# Patient Record
Sex: FEMALE | Race: WHITE | NOT HISPANIC OR LATINO | Employment: OTHER | URBAN - NONMETROPOLITAN AREA
[De-identification: names, ages, dates, MRNs, and addresses within clinical notes are randomized per-mention and may not be internally consistent; named-entity substitution may affect disease eponyms.]

---

## 2019-05-31 ENCOUNTER — HOSPITAL ENCOUNTER (OUTPATIENT)
Dept: MAMMOGRAPHY | Facility: HOSPITAL | Age: 74
Discharge: HOME | End: 2019-05-31
Admitting: PHYSICIAN ASSISTANT

## 2019-05-31 DIAGNOSIS — Z12.39 SCREENING BREAST EXAMINATION: ICD-10-CM

## 2019-05-31 PROCEDURE — 77063 BREAST TOMOSYNTHESIS BI: CPT

## 2019-05-31 PROCEDURE — 77067 SCR MAMMO BI INCL CAD: CPT | Performed by: RADIOLOGY

## 2019-05-31 PROCEDURE — 77063 BREAST TOMOSYNTHESIS BI: CPT | Performed by: RADIOLOGY

## 2019-05-31 PROCEDURE — 77067 SCR MAMMO BI INCL CAD: CPT

## 2019-06-26 ENCOUNTER — HOSPITAL ENCOUNTER (OUTPATIENT)
Dept: ULTRASOUND IMAGING | Facility: HOSPITAL | Age: 74
Discharge: HOME | End: 2019-06-26

## 2019-06-26 ENCOUNTER — HOSPITAL ENCOUNTER (OUTPATIENT)
Dept: MAMMOGRAPHY | Facility: HOSPITAL | Age: 74
Discharge: HOME | End: 2019-06-26
Admitting: RADIOLOGY

## 2019-06-26 DIAGNOSIS — R92.8 ABNORMAL MAMMOGRAM: ICD-10-CM

## 2019-06-26 PROCEDURE — 76642 ULTRASOUND BREAST LIMITED: CPT

## 2019-06-26 PROCEDURE — 77065 DX MAMMO INCL CAD UNI: CPT

## 2019-06-26 PROCEDURE — 76642 ULTRASOUND BREAST LIMITED: CPT | Performed by: RADIOLOGY

## 2019-06-26 PROCEDURE — G0279 TOMOSYNTHESIS, MAMMO: HCPCS

## 2019-06-26 PROCEDURE — 77065 DX MAMMO INCL CAD UNI: CPT | Performed by: RADIOLOGY

## 2019-06-26 PROCEDURE — G0279 TOMOSYNTHESIS, MAMMO: HCPCS | Performed by: RADIOLOGY

## 2019-07-17 ENCOUNTER — HOSPITAL ENCOUNTER (EMERGENCY)
Facility: HOSPITAL | Age: 74
Discharge: HOME | End: 2019-07-17
Admitting: EMERGENCY MEDICINE

## 2019-07-17 VITALS
TEMPERATURE: 98.2 F | HEART RATE: 84 BPM | SYSTOLIC BLOOD PRESSURE: 146 MMHG | BODY MASS INDEX: 28.47 KG/M2 | RESPIRATION RATE: 18 BRPM | OXYGEN SATURATION: 100 % | DIASTOLIC BLOOD PRESSURE: 76 MMHG | HEIGHT: 60 IN | WEIGHT: 145 LBS

## 2019-07-17 DIAGNOSIS — R11.10 VOMITING AND DIARRHEA: Primary | ICD-10-CM

## 2019-07-17 DIAGNOSIS — R19.7 VOMITING AND DIARRHEA: Primary | ICD-10-CM

## 2019-07-17 LAB
ALBUMIN SERPL-MCNC: 4.3 G/DL (ref 3.5–5.2)
ALBUMIN/GLOB SERPL: 1.5 G/DL
ALP SERPL-CCNC: 79 U/L (ref 39–117)
ALT SERPL W P-5'-P-CCNC: 27 U/L (ref 1–33)
AMYLASE SERPL-CCNC: 31 U/L (ref 28–100)
ANION GAP SERPL CALCULATED.3IONS-SCNC: 14 MMOL/L (ref 5–15)
AST SERPL-CCNC: 27 U/L (ref 1–32)
BASOPHILS # BLD AUTO: 0.01 10*3/MM3 (ref 0–0.2)
BASOPHILS NFR BLD AUTO: 0.1 % (ref 0–1.5)
BILIRUB SERPL-MCNC: 0.6 MG/DL (ref 0.2–1.2)
BILIRUB UR QL STRIP: NEGATIVE
BUN BLD-MCNC: 22 MG/DL (ref 8–23)
BUN/CREAT SERPL: 28.9 (ref 7–25)
CALCIUM SPEC-SCNC: 9.9 MG/DL (ref 8.6–10.5)
CHLORIDE SERPL-SCNC: 102 MMOL/L (ref 98–107)
CLARITY UR: CLEAR
CO2 SERPL-SCNC: 24 MMOL/L (ref 22–29)
COLOR UR: YELLOW
CREAT BLD-MCNC: 0.76 MG/DL (ref 0.57–1)
DEPRECATED RDW RBC AUTO: 42.8 FL (ref 37–54)
EOSINOPHIL # BLD AUTO: 0.04 10*3/MM3 (ref 0–0.4)
EOSINOPHIL NFR BLD AUTO: 0.5 % (ref 0.3–6.2)
ERYTHROCYTE [DISTWIDTH] IN BLOOD BY AUTOMATED COUNT: 12.7 % (ref 12.3–15.4)
GFR SERPL CREATININE-BSD FRML MDRD: 75 ML/MIN/1.73
GLOBULIN UR ELPH-MCNC: 2.9 GM/DL
GLUCOSE BLD-MCNC: 145 MG/DL (ref 65–99)
GLUCOSE UR STRIP-MCNC: NEGATIVE MG/DL
HCT VFR BLD AUTO: 40.6 % (ref 34–46.6)
HGB BLD-MCNC: 13.4 G/DL (ref 12–15.9)
HGB UR QL STRIP.AUTO: NEGATIVE
IMM GRANULOCYTES # BLD AUTO: 0.02 10*3/MM3 (ref 0–0.05)
IMM GRANULOCYTES NFR BLD AUTO: 0.2 % (ref 0–0.5)
KETONES UR QL STRIP: NEGATIVE
LEUKOCYTE ESTERASE UR QL STRIP.AUTO: NEGATIVE
LIPASE SERPL-CCNC: 43 U/L (ref 13–60)
LYMPHOCYTES # BLD AUTO: 1.62 10*3/MM3 (ref 0.7–3.1)
LYMPHOCYTES NFR BLD AUTO: 18.6 % (ref 19.6–45.3)
MCH RBC QN AUTO: 31.5 PG (ref 26.6–33)
MCHC RBC AUTO-ENTMCNC: 33 G/DL (ref 31.5–35.7)
MCV RBC AUTO: 95.3 FL (ref 79–97)
MONOCYTES # BLD AUTO: 0.31 10*3/MM3 (ref 0.1–0.9)
MONOCYTES NFR BLD AUTO: 3.6 % (ref 5–12)
NEUTROPHILS # BLD AUTO: 6.7 10*3/MM3 (ref 1.7–7)
NEUTROPHILS NFR BLD AUTO: 77 % (ref 42.7–76)
NITRITE UR QL STRIP: NEGATIVE
PH UR STRIP.AUTO: 7 [PH] (ref 5–8)
PLATELET # BLD AUTO: 316 10*3/MM3 (ref 140–450)
PMV BLD AUTO: 10 FL (ref 6–12)
POTASSIUM BLD-SCNC: 3.8 MMOL/L (ref 3.5–5.2)
PROT SERPL-MCNC: 7.2 G/DL (ref 6–8.5)
PROT UR QL STRIP: NEGATIVE
RBC # BLD AUTO: 4.26 10*6/MM3 (ref 3.77–5.28)
SODIUM BLD-SCNC: 140 MMOL/L (ref 136–145)
SP GR UR STRIP: 1.02 (ref 1–1.03)
TROPONIN T SERPL-MCNC: <0.01 NG/ML (ref 0–0.03)
UROBILINOGEN UR QL STRIP: NORMAL
WBC NRBC COR # BLD: 8.7 10*3/MM3 (ref 3.4–10.8)

## 2019-07-17 PROCEDURE — 82150 ASSAY OF AMYLASE: CPT | Performed by: NURSE PRACTITIONER

## 2019-07-17 PROCEDURE — 83690 ASSAY OF LIPASE: CPT | Performed by: NURSE PRACTITIONER

## 2019-07-17 PROCEDURE — 81003 URINALYSIS AUTO W/O SCOPE: CPT | Performed by: NURSE PRACTITIONER

## 2019-07-17 PROCEDURE — 85025 COMPLETE CBC W/AUTO DIFF WBC: CPT | Performed by: NURSE PRACTITIONER

## 2019-07-17 PROCEDURE — 93005 ELECTROCARDIOGRAM TRACING: CPT | Performed by: NURSE PRACTITIONER

## 2019-07-17 PROCEDURE — 93010 ELECTROCARDIOGRAM REPORT: CPT | Performed by: INTERNAL MEDICINE

## 2019-07-17 PROCEDURE — 99284 EMERGENCY DEPT VISIT MOD MDM: CPT

## 2019-07-17 PROCEDURE — 84484 ASSAY OF TROPONIN QUANT: CPT | Performed by: NURSE PRACTITIONER

## 2019-07-17 PROCEDURE — 80053 COMPREHEN METABOLIC PANEL: CPT | Performed by: NURSE PRACTITIONER

## 2019-07-17 RX ORDER — ONDANSETRON 4 MG/1
4 TABLET, ORALLY DISINTEGRATING ORAL EVERY 6 HOURS PRN
Qty: 12 TABLET | Refills: 0 | Status: SHIPPED | OUTPATIENT
Start: 2019-07-17 | End: 2021-08-23

## 2019-07-17 RX ORDER — SODIUM CHLORIDE 0.9 % (FLUSH) 0.9 %
10 SYRINGE (ML) INJECTION AS NEEDED
Status: DISCONTINUED | OUTPATIENT
Start: 2019-07-17 | End: 2019-07-17 | Stop reason: HOSPADM

## 2019-07-17 RX ORDER — LOSARTAN POTASSIUM AND HYDROCHLOROTHIAZIDE 25; 100 MG/1; MG/1
1 TABLET ORAL DAILY
COMMUNITY
End: 2021-06-17 | Stop reason: ALTCHOICE

## 2021-02-17 DIAGNOSIS — Z23 IMMUNIZATION DUE: ICD-10-CM

## 2021-06-15 ENCOUNTER — HOSPITAL ENCOUNTER (EMERGENCY)
Facility: HOSPITAL | Age: 76
Discharge: HOME | End: 2021-06-15
Attending: EMERGENCY MEDICINE | Admitting: EMERGENCY MEDICINE

## 2021-06-15 ENCOUNTER — APPOINTMENT (OUTPATIENT)
Dept: GENERAL RADIOLOGY | Facility: HOSPITAL | Age: 76
End: 2021-06-15

## 2021-06-15 VITALS
SYSTOLIC BLOOD PRESSURE: 148 MMHG | HEIGHT: 60 IN | OXYGEN SATURATION: 99 % | BODY MASS INDEX: 27.48 KG/M2 | WEIGHT: 140 LBS | TEMPERATURE: 98 F | DIASTOLIC BLOOD PRESSURE: 74 MMHG | RESPIRATION RATE: 18 BRPM | HEART RATE: 68 BPM

## 2021-06-15 DIAGNOSIS — R00.2 PALPITATIONS: Primary | ICD-10-CM

## 2021-06-15 DIAGNOSIS — J18.9 PNEUMONIA OF RIGHT UPPER LOBE DUE TO INFECTIOUS ORGANISM: ICD-10-CM

## 2021-06-15 LAB
ALBUMIN SERPL-MCNC: 4.03 G/DL (ref 3.5–5.2)
ALBUMIN/GLOB SERPL: 1.3 G/DL
ALP SERPL-CCNC: 78 U/L (ref 39–117)
ALT SERPL W P-5'-P-CCNC: 25 U/L (ref 1–33)
ANION GAP SERPL CALCULATED.3IONS-SCNC: 9.9 MMOL/L (ref 5–15)
AST SERPL-CCNC: 26 U/L (ref 1–32)
BASOPHILS # BLD AUTO: 0.05 10*3/MM3 (ref 0–0.2)
BASOPHILS NFR BLD AUTO: 0.5 % (ref 0–1.5)
BILIRUB SERPL-MCNC: 0.6 MG/DL (ref 0–1.2)
BUN SERPL-MCNC: 21 MG/DL (ref 8–23)
BUN/CREAT SERPL: 23.1 (ref 7–25)
CALCIUM SPEC-SCNC: 9.9 MG/DL (ref 8.6–10.5)
CHLORIDE SERPL-SCNC: 106 MMOL/L (ref 98–107)
CO2 SERPL-SCNC: 25.1 MMOL/L (ref 22–29)
CREAT SERPL-MCNC: 0.91 MG/DL (ref 0.57–1)
DEPRECATED RDW RBC AUTO: 43.5 FL (ref 37–54)
EOSINOPHIL # BLD AUTO: 0.2 10*3/MM3 (ref 0–0.4)
EOSINOPHIL NFR BLD AUTO: 2.1 % (ref 0.3–6.2)
ERYTHROCYTE [DISTWIDTH] IN BLOOD BY AUTOMATED COUNT: 12.4 % (ref 12.3–15.4)
GFR SERPL CREATININE-BSD FRML MDRD: 60 ML/MIN/1.73
GLOBULIN UR ELPH-MCNC: 3.2 GM/DL
GLUCOSE SERPL-MCNC: 112 MG/DL (ref 65–99)
HCT VFR BLD AUTO: 38.7 % (ref 34–46.6)
HGB BLD-MCNC: 13.2 G/DL (ref 12–15.9)
HOLD SPECIMEN: NORMAL
HOLD SPECIMEN: NORMAL
IMM GRANULOCYTES # BLD AUTO: 0.03 10*3/MM3 (ref 0–0.05)
IMM GRANULOCYTES NFR BLD AUTO: 0.3 % (ref 0–0.5)
LYMPHOCYTES # BLD AUTO: 2.94 10*3/MM3 (ref 0.7–3.1)
LYMPHOCYTES NFR BLD AUTO: 31.4 % (ref 19.6–45.3)
MAGNESIUM SERPL-MCNC: 2 MG/DL (ref 1.6–2.4)
MCH RBC QN AUTO: 32.4 PG (ref 26.6–33)
MCHC RBC AUTO-ENTMCNC: 34.1 G/DL (ref 31.5–35.7)
MCV RBC AUTO: 95.1 FL (ref 79–97)
MONOCYTES # BLD AUTO: 0.72 10*3/MM3 (ref 0.1–0.9)
MONOCYTES NFR BLD AUTO: 7.7 % (ref 5–12)
NEUTROPHILS NFR BLD AUTO: 5.42 10*3/MM3 (ref 1.7–7)
NEUTROPHILS NFR BLD AUTO: 58 % (ref 42.7–76)
NRBC BLD AUTO-RTO: 0 /100 WBC (ref 0–0.2)
PLATELET # BLD AUTO: 305 10*3/MM3 (ref 140–450)
PMV BLD AUTO: 9.6 FL (ref 6–12)
POTASSIUM SERPL-SCNC: 3.9 MMOL/L (ref 3.5–5.2)
PROT SERPL-MCNC: 7.2 G/DL (ref 6–8.5)
QT INTERVAL: 358 MS
QTC INTERVAL: 433 MS
RBC # BLD AUTO: 4.07 10*6/MM3 (ref 3.77–5.28)
SODIUM SERPL-SCNC: 141 MMOL/L (ref 136–145)
TROPONIN T SERPL-MCNC: <0.01 NG/ML (ref 0–0.03)
TROPONIN T SERPL-MCNC: <0.01 NG/ML (ref 0–0.03)
TSH SERPL DL<=0.05 MIU/L-ACNC: 1.53 UIU/ML (ref 0.27–4.2)
WBC # BLD AUTO: 9.36 10*3/MM3 (ref 3.4–10.8)
WHOLE BLOOD HOLD SPECIMEN: NORMAL

## 2021-06-15 PROCEDURE — 71045 X-RAY EXAM CHEST 1 VIEW: CPT

## 2021-06-15 PROCEDURE — 99284 EMERGENCY DEPT VISIT MOD MDM: CPT

## 2021-06-15 PROCEDURE — 80053 COMPREHEN METABOLIC PANEL: CPT | Performed by: PHYSICIAN ASSISTANT

## 2021-06-15 PROCEDURE — 93010 ELECTROCARDIOGRAM REPORT: CPT | Performed by: INTERNAL MEDICINE

## 2021-06-15 PROCEDURE — 85025 COMPLETE CBC W/AUTO DIFF WBC: CPT | Performed by: PHYSICIAN ASSISTANT

## 2021-06-15 PROCEDURE — 71045 X-RAY EXAM CHEST 1 VIEW: CPT | Performed by: RADIOLOGY

## 2021-06-15 PROCEDURE — 84443 ASSAY THYROID STIM HORMONE: CPT | Performed by: PHYSICIAN ASSISTANT

## 2021-06-15 PROCEDURE — 83735 ASSAY OF MAGNESIUM: CPT | Performed by: PHYSICIAN ASSISTANT

## 2021-06-15 PROCEDURE — 84484 ASSAY OF TROPONIN QUANT: CPT | Performed by: EMERGENCY MEDICINE

## 2021-06-15 PROCEDURE — 93005 ELECTROCARDIOGRAM TRACING: CPT | Performed by: PHYSICIAN ASSISTANT

## 2021-06-15 RX ORDER — SODIUM CHLORIDE 0.9 % (FLUSH) 0.9 %
10 SYRINGE (ML) INJECTION AS NEEDED
Status: DISCONTINUED | OUTPATIENT
Start: 2021-06-15 | End: 2021-06-15 | Stop reason: HOSPADM

## 2021-06-15 RX ORDER — DOXYCYCLINE 100 MG/1
100 CAPSULE ORAL 2 TIMES DAILY
Qty: 20 CAPSULE | Refills: 0 | Status: SHIPPED | OUTPATIENT
Start: 2021-06-15 | End: 2021-06-25

## 2021-06-15 NOTE — ED PROVIDER NOTES
"Subjective   75-year-old female presents to the ED today due to palpitations.  She states she woke up around 5:45 AM today and felt like her heart was racing.  She states this lasted intermittently for about 2 hours.  She states that her heart rate got as high as 139.  She states while this was going on her blood pressure would \"go up and down.\"  She states this started while she was just laying in the bed.  She states she did have a headache while this was going on but this has since resolved.  She states she did have a similar episode about 2 months ago during her nephew's .  She denies any chest pain or shortness of breath.  She denies any fever or cough.  She denies any recent illness.  She denies any abdominal pain.  She denies any vomiting or diarrhea.  She states currently her symptoms have all resolved.      History provided by:  Patient  Palpitations  Palpitations quality:  Fast  Onset quality:  Sudden  Duration:  2 hours  Timing:  Constant  Progression:  Waxing and waning  Chronicity:  Recurrent  Relieved by:  Nothing  Worsened by:  Nothing  Associated symptoms: no back pain, no chest pain, no chest pressure, no cough, no diaphoresis, no dizziness, no leg pain, no lower extremity edema, no malaise/fatigue, no nausea, no near-syncope, no shortness of breath, no syncope, no vomiting and no weakness    Risk factors: hx of thyroid disease        Review of Systems   Constitutional: Negative.  Negative for diaphoresis and malaise/fatigue.   HENT: Negative.    Eyes: Negative.    Respiratory: Negative for cough, chest tightness and shortness of breath.    Cardiovascular: Positive for palpitations. Negative for chest pain, leg swelling, syncope and near-syncope.   Gastrointestinal: Negative for abdominal pain, nausea and vomiting.   Genitourinary: Negative.    Musculoskeletal: Negative for back pain.   Skin: Negative.    Neurological: Negative for dizziness and weakness.   Psychiatric/Behavioral: Negative.  "   All other systems reviewed and are negative.      Past Medical History:   Diagnosis Date   • Disease of thyroid gland    • Hypertension        No Known Allergies    Past Surgical History:   Procedure Laterality Date   • BREAST SURGERY     • THYROID SURGERY     • THYROID SURGERY     • TUBAL ABDOMINAL LIGATION         Family History   Problem Relation Age of Onset   • Breast cancer Neg Hx        Social History     Socioeconomic History   • Marital status:      Spouse name: Not on file   • Number of children: Not on file   • Years of education: Not on file   • Highest education level: Not on file   Tobacco Use   • Smoking status: Never Smoker   • Smokeless tobacco: Never Used   Substance and Sexual Activity   • Alcohol use: No   • Drug use: No           Objective   Physical Exam  Vitals and nursing note reviewed.   Constitutional:       General: She is not in acute distress.     Appearance: Normal appearance. She is not diaphoretic.   HENT:      Head: Normocephalic and atraumatic.      Right Ear: External ear normal.      Left Ear: External ear normal.   Eyes:      Conjunctiva/sclera: Conjunctivae normal.      Pupils: Pupils are equal, round, and reactive to light.   Cardiovascular:      Rate and Rhythm: Normal rate and regular rhythm.      Pulses: Normal pulses.      Heart sounds: Normal heart sounds.   Pulmonary:      Effort: Pulmonary effort is normal.      Breath sounds: Normal breath sounds.   Abdominal:      General: Bowel sounds are normal.      Palpations: Abdomen is soft.      Tenderness: There is no abdominal tenderness.   Musculoskeletal:         General: Normal range of motion.      Cervical back: Normal range of motion and neck supple.      Right lower leg: No edema.      Left lower leg: No edema.   Skin:     General: Skin is warm and dry.      Capillary Refill: Capillary refill takes less than 2 seconds.   Neurological:      General: No focal deficit present.      Mental Status: She is alert and  oriented to person, place, and time.   Psychiatric:         Mood and Affect: Mood normal.         Procedures           ED Course  ED Course as of Hans 15 1308   Tue Hans 15, 2021   1041 Normal sinus rhythm.  Rate 88.  Left axis deviation.  Normal QT interval.  Q waves in lead III, aVF, and V1.  No acute ST elevation or depression.  Abnormal EKG.  Interpreted by me.   ECG 12 Lead [BC]   1216 FINDINGS:     Right apical consolidation  The cardiac silhouette is normal. The pulmonary vasculature is  unremarkable.  There is no evidence of an acute osseous abnormality.   There are no suspicious-appearing parenchymal soft tissue nodules.        IMPRESSION:  Right apical airspace disease   XR Chest 1 View [AH]   1259 Normal sinus rhythm.  Rate 68.  Borderline left axis.  Q waves in lead III, aVF.  No acute ST elevation or depression.  Interpreted by me.   ECG 12 Lead [BC]   1305 Patient has been resting comfortably during her ED stay, no acute distress.  She has not had any palpitations or episodes of tachycardia while in the ED.  I did discuss her chest x-ray with Dr. Palmer.  He does believe that she has a pneumonia in the right upper lobe.  She will be treated for this with doxycycline.  She was advised to follow-up outpatient with cardiology and she will return to the ED if her symptoms change or worsen.    [AH]      ED Course User Index  [] Keysha Henderson PA  [BC] Cristhian Neely MD                                           MDM  Number of Diagnoses or Management Options     Amount and/or Complexity of Data Reviewed  Clinical lab tests: reviewed  Tests in the radiology section of CPT®: reviewed  Tests in the medicine section of CPT®: reviewed    Patient Progress  Patient progress: improved      Final diagnoses:   Palpitations   Pneumonia of right upper lobe due to infectious organism       ED Disposition  ED Disposition     ED Disposition Condition Comment    Discharge Stable           Dante Lin PA  [address]  [address] [address]  [phone]    Schedule an appointment as soon as possible for a visit in 2 days      Andre Melgoza MD  [address] [facility]  [phone]    Schedule an appointment as soon as possible for a visit in 1 week           Medication List      New Prescriptions    doxycycline 100 MG capsule  Commonly known as: MONODOX  Take 1 capsule by mouth 2 (Two) Times a Day for 10 days.           Where to Get Your Medications      You can get these medications from any pharmacy    Bring a paper prescription for each of these medications  · doxycycline 100 MG capsule          Keysha Henderson PA  06/15/21 1145

## 2021-06-16 LAB
QT INTERVAL: 410 MS
QTC INTERVAL: 435 MS

## 2021-06-17 ENCOUNTER — OFFICE VISIT (OUTPATIENT)
Dept: CARDIOLOGY | Facility: CLINIC | Age: 76
End: 2021-06-17

## 2021-06-17 VITALS
SYSTOLIC BLOOD PRESSURE: 152 MMHG | TEMPERATURE: 97.5 F | OXYGEN SATURATION: 98 % | WEIGHT: 151.4 LBS | RESPIRATION RATE: 16 BRPM | HEIGHT: 60 IN | BODY MASS INDEX: 29.72 KG/M2 | HEART RATE: 70 BPM | DIASTOLIC BLOOD PRESSURE: 86 MMHG

## 2021-06-17 DIAGNOSIS — E78.2 MIXED HYPERLIPIDEMIA: ICD-10-CM

## 2021-06-17 DIAGNOSIS — R00.2 PALPITATIONS: ICD-10-CM

## 2021-06-17 DIAGNOSIS — R06.02 SHORTNESS OF BREATH: ICD-10-CM

## 2021-06-17 DIAGNOSIS — R07.2 PRECORDIAL CHEST PAIN: ICD-10-CM

## 2021-06-17 DIAGNOSIS — G47.33 OSA (OBSTRUCTIVE SLEEP APNEA): ICD-10-CM

## 2021-06-17 DIAGNOSIS — I10 ESSENTIAL HYPERTENSION: Primary | ICD-10-CM

## 2021-06-17 DIAGNOSIS — R73.03 PREDIABETES: ICD-10-CM

## 2021-06-17 DIAGNOSIS — R94.31 ABNORMAL ELECTROCARDIOGRAM: ICD-10-CM

## 2021-06-17 DIAGNOSIS — C73 MALIGNANT NEOPLASM OF THYROID GLAND (HCC): ICD-10-CM

## 2021-06-17 PROCEDURE — 99204 OFFICE O/P NEW MOD 45 MIN: CPT | Performed by: SPECIALIST

## 2021-06-17 RX ORDER — LOSARTAN POTASSIUM 100 MG/1
100 TABLET ORAL DAILY
COMMUNITY
End: 2021-07-29

## 2021-06-17 NOTE — PROGRESS NOTES
"Subjective   Initial consultation, hypertension, bradycardia  Cindy Medeiros is a 75 y.o. female who presents to day for Palpitations (race,pounds, [facility] er visit), Chest Pain (\"sometimes\"), Edema (feet), and Med Management (list provided).    CHIEF COMPLIANT  Chief Complaint   Patient presents with   • Palpitations     race,pounds, [facility] er visit   • Chest Pain     \"sometimes\"   • Edema     feet   • Med Management     list provided       Active Problems:  Problem List Items Addressed This Visit        Cardiac and Vasculature    Precordial chest pain    Relevant Orders    Stress Test With Myocardial Perfusion One Day    Adult Transthoracic Echo Complete w/ Color, Spectral and Contrast if necessary per protocol    Essential hypertension - Primary    Relevant Medications    losartan (COZAAR) 100 MG tablet    Mixed hyperlipidemia    Relevant Orders    Lipid Panel    Palpitations    Relevant Orders    Adult Transthoracic Echo Complete w/ Color, Spectral and Contrast if necessary per protocol    Holter Monitor - 24 Hour    Abnormal electrocardiogram       Endocrine and Metabolic    Prediabetes    Relevant Orders    Hemoglobin A1c       Hematology and Neoplasia    Malignant neoplasm of thyroid gland (CMS/HCC)       Pulmonary and Pneumonias    Shortness of breath       Sleep    BRUCE (obstructive sleep apnea)    Relevant Orders    Home Sleep Study          HPI  HPI  Has been labile and difficult to control also she was noted to have slow heartbeat in the 50s she also has occasional palpitations when she feels her heart is racing last was last week,  blood pressure goes up and down and she was diagnosed also recently with pneumonia she started antibiotics shortness of breath to 1 flight of steps on and off also retrosternal chest pressure few minutes usually takes aspirin when she had this happens is not sure if any of this chest pressure is related to exertion or food she also has lower extremity edema bilaterally is also sedentary " because of bilateral knee arthritis diffusely tired and sleepy during the day but she is not sure if she snores at night or not never smoked she has hypertension as mentioned above no diabetes she has hyperlipidemia no family history of cardiac problems  PRIOR MEDS  Current Outpatient Medications on File Prior to Visit   Medication Sig Dispense Refill   • doxycycline (MONODOX) 100 MG capsule Take 1 capsule by mouth 2 (Two) Times a Day for 10 days. 20 capsule 0   • hydrochlorothiazide (HYDRODIURIL) 12.5 MG tablet Take 12.5 mg by mouth Daily.     • levothyroxine (SYNTHROID, LEVOTHROID) 100 MCG tablet Take 88 mcg by mouth Daily.     • losartan (COZAAR) 100 MG tablet Take 100 mg by mouth Daily.     • meloxicam (MOBIC) 7.5 MG tablet Take 7.5 mg by mouth 2 (Two) Times a Day As Needed.     • Multiple Minerals-Vitamins (CALCIUM CITRATE +) tablet Take  by mouth.     • ondansetron ODT (ZOFRAN-ODT) 4 MG disintegrating tablet Take 1 tablet by mouth Every 6 (Six) Hours As Needed for Nausea or Vomiting. 12 tablet 0   • [DISCONTINUED] lisinopril (PRINIVIL,ZESTRIL) 20 MG tablet      • [DISCONTINUED] losartan-hydrochlorothiazide (HYZAAR) 100-25 MG per tablet Take 1 tablet by mouth Daily.       No current facility-administered medications on file prior to visit.       ALLERGIES  Patient has no known allergies.    HISTORY  Past Medical History:   Diagnosis Date   • Cancer (CMS/HCC)     thyroid s/p thyroidectomy   • Disease of thyroid gland    • Hypertension        Social History     Socioeconomic History   • Marital status:      Spouse name: Not on file   • Number of children: Not on file   • Years of education: Not on file   • Highest education level: Not on file   Tobacco Use   • Smoking status: Never Smoker   • Smokeless tobacco: Never Used   Substance and Sexual Activity   • Alcohol use: No   • Drug use: No       Family History   Problem Relation Age of Onset   • Breast cancer Neg Hx        Review of Systems  "  Constitutional: Positive for fatigue.   Eyes: Positive for visual disturbance.   Cardiovascular: Positive for palpitations and leg swelling. Negative for chest pain.   Gastrointestinal: Positive for diarrhea.   Musculoskeletal: Positive for arthralgias and joint swelling.   Neurological: Positive for headaches.   Psychiatric/Behavioral: The patient is nervous/anxious.        Objective     VITALS: /86 (BP Location: Left arm)   Pulse 70   Temp 97.5 °F (36.4 °C)   Resp 16   Ht 152.4 cm (60\")   Wt 68.7 kg (151 lb 6.4 oz)   SpO2 98%   BMI 29.57 kg/m²     LABS:   Lab Results (most recent)     None          IMAGING:   XR Chest 1 View    Result Date: 6/15/2021  Right apical airspace disease  This report was finalized on 6/15/2021 11:14 AM by Dr. Law Palmer MD.        EXAM:  Physical Exam  Vitals reviewed.   Constitutional:       Appearance: She is well-developed.   HENT:      Head: Normocephalic and atraumatic.   Eyes:      Pupils: Pupils are equal, round, and reactive to light.   Neck:      Thyroid: No thyromegaly.      Vascular: No JVD.   Cardiovascular:      Rate and Rhythm: Normal rate and regular rhythm.      Heart sounds: Normal heart sounds. No murmur heard.   No friction rub. No gallop.       Comments: Mild bilateral leg edema    Pulmonary:      Effort: Pulmonary effort is normal. No respiratory distress.      Breath sounds: Normal breath sounds. No stridor. No wheezing or rales.   Chest:      Chest wall: No tenderness.   Musculoskeletal:         General: No tenderness or deformity.      Cervical back: Neck supple.   Skin:     General: Skin is warm and dry.   Neurological:      Mental Status: She is alert and oriented to person, place, and time.      Cranial Nerves: No cranial nerve deficit.      Coordination: Coordination normal.         Procedure   Procedures       Assessment/Plan     Diagnoses and all orders for this visit:    1. Essential hypertension (Primary)    2. Precordial chest pain  -    "  Stress Test With Myocardial Perfusion One Day; Future  -     Adult Transthoracic Echo Complete w/ Color, Spectral and Contrast if necessary per protocol; Future    3. Mixed hyperlipidemia  -     Lipid Panel; Future    4. Palpitations  -     Adult Transthoracic Echo Complete w/ Color, Spectral and Contrast if necessary per protocol; Future  -     Holter Monitor - 24 Hour; Future    5. Malignant neoplasm of thyroid gland (CMS/HCC)    6. BRUCE (obstructive sleep apnea)  -     Home Sleep Study; Future    7. Abnormal electrocardiogram    8. Prediabetes  -     Hemoglobin A1c; Future    9. Shortness of breath    1.  Her blood pressure has been a little bit labile I have reviewed the blood pressure measurements at home which ranges from 96 systolic to over 160 her blood pressure today is a little bit elevated but I will monitor for now at this the first time I see her, will adjust her medications after the work-up and monitoring her blood pressure  2.  I am concerned about the chest pains her EKG is suggestive of possible inferior infarction in the past some going to proceed with nuclear stress testing she will do a chemical 1 as she has bilateral knee arthritis and she is not able to walk for a long distance  3.  We will get an echocardiogram to assess her cardiac function wall motion valve morphology  4.  With the palpitations and bradycardia and went to get a Holter monitor for assessment of her cardiac rhythm and rate  5.  Her symptoms are consistent with sleep apnea will proceed with home sleep study for assessment for sleep apnea  6.  I reviewed her labs at the hospital with elevated fasting sugar according to the patient and her daughter she was fasting on both testing this would be consistent with prediabetes I am going to also check her hemoglobin A1c  7.  She was told in the past that she is slightly elevated cholesterol I will check a lipid profile and considering her risk factors if her lipids are elevated will  consider adding statin      Return After stress test.      Advance Care Planning   ACP discussion was declined by the patient. Patient does not have an advance directive, declines further assistance.          MEDS ORDERED DURING VISIT:  No orders of the defined types were placed in this encounter.      As always, Dante Lin PA  I appreciate very much the opportunity to participate in the cardiovascular care of your patients. Please do not hesitate to call me with any questions with regards to Cindy Mala evaluation and management.         This document has been electronically signed by Andre Melgoza MD  June 17, 2021 10:09 EDT

## 2021-07-12 DIAGNOSIS — G47.33 OSA (OBSTRUCTIVE SLEEP APNEA): Primary | ICD-10-CM

## 2021-07-15 ENCOUNTER — HOSPITAL ENCOUNTER (OUTPATIENT)
Dept: NUCLEAR MEDICINE | Facility: HOSPITAL | Age: 76
Discharge: HOME | End: 2021-07-15

## 2021-07-15 ENCOUNTER — HOSPITAL ENCOUNTER (OUTPATIENT)
Dept: CARDIOLOGY | Facility: HOSPITAL | Age: 76
Discharge: HOME | End: 2021-07-15

## 2021-07-15 ENCOUNTER — HOSPITAL ENCOUNTER (OUTPATIENT)
Dept: GENERAL RADIOLOGY | Facility: HOSPITAL | Age: 76
Discharge: HOME | End: 2021-07-15

## 2021-07-15 ENCOUNTER — TRANSCRIBE ORDERS (OUTPATIENT)
Dept: ADMINISTRATIVE | Facility: HOSPITAL | Age: 76
End: 2021-07-15

## 2021-07-15 ENCOUNTER — LAB (OUTPATIENT)
Dept: LAB | Facility: HOSPITAL | Age: 76
End: 2021-07-15

## 2021-07-15 ENCOUNTER — TELEPHONE (OUTPATIENT)
Dept: CARDIOLOGY | Facility: CLINIC | Age: 76
End: 2021-07-15

## 2021-07-15 DIAGNOSIS — R07.2 PRECORDIAL CHEST PAIN: ICD-10-CM

## 2021-07-15 DIAGNOSIS — J18.9 PNEUMONIA DUE TO INFECTIOUS ORGANISM, UNSPECIFIED LATERALITY, UNSPECIFIED PART OF LUNG: ICD-10-CM

## 2021-07-15 DIAGNOSIS — J18.9 PNEUMONIA DUE TO INFECTIOUS ORGANISM, UNSPECIFIED LATERALITY, UNSPECIFIED PART OF LUNG: Primary | ICD-10-CM

## 2021-07-15 DIAGNOSIS — G47.33 OSA (OBSTRUCTIVE SLEEP APNEA): ICD-10-CM

## 2021-07-15 DIAGNOSIS — R00.2 PALPITATIONS: ICD-10-CM

## 2021-07-15 LAB
BH CV ECHO MEAS - % IVS THICK: -0.28 %
BH CV ECHO MEAS - % LVPW THICK: 14.1 %
BH CV ECHO MEAS - ACS: 2.1 CM
BH CV ECHO MEAS - AO MAX PG: 6.3 MMHG
BH CV ECHO MEAS - AO MEAN PG: 3 MMHG
BH CV ECHO MEAS - AO ROOT AREA (BSA CORRECTED): 1.6
BH CV ECHO MEAS - AO ROOT AREA: 5.7 CM^2
BH CV ECHO MEAS - AO ROOT DIAM: 2.7 CM
BH CV ECHO MEAS - AO V2 MAX: 125 CM/SEC
BH CV ECHO MEAS - AO V2 MEAN: 82.4 CM/SEC
BH CV ECHO MEAS - AO V2 VTI: 28.3 CM
BH CV ECHO MEAS - BSA(HAYCOCK): 1.7 M^2
BH CV ECHO MEAS - BSA: 1.7 M^2
BH CV ECHO MEAS - BZI_BMI: 29.5 KILOGRAMS/M^2
BH CV ECHO MEAS - BZI_METRIC_HEIGHT: 152.4 CM
BH CV ECHO MEAS - BZI_METRIC_WEIGHT: 68.5 KG
BH CV ECHO MEAS - EDV(CUBED): 113.7 ML
BH CV ECHO MEAS - EDV(MOD-SP4): 91.1 ML
BH CV ECHO MEAS - EDV(TEICH): 109.9 ML
BH CV ECHO MEAS - EF(CUBED): 77.7 %
BH CV ECHO MEAS - EF(MOD-SP4): 60.3 %
BH CV ECHO MEAS - EF(TEICH): 69.7 %
BH CV ECHO MEAS - ESV(CUBED): 25.4 ML
BH CV ECHO MEAS - ESV(MOD-SP4): 36.2 ML
BH CV ECHO MEAS - ESV(TEICH): 33.3 ML
BH CV ECHO MEAS - FS: 39.3 %
BH CV ECHO MEAS - IVS/LVPW: 1.1
BH CV ECHO MEAS - IVSD: 1.1 CM
BH CV ECHO MEAS - IVSS: 1.1 CM
BH CV ECHO MEAS - LA DIMENSION: 3.1 CM
BH CV ECHO MEAS - LA/AO: 1.1
BH CV ECHO MEAS - LV DIASTOLIC VOL/BSA (35-75): 55 ML/M^2
BH CV ECHO MEAS - LV MASS(C)D: 178.2 GRAMS
BH CV ECHO MEAS - LV MASS(C)DI: 107.6 GRAMS/M^2
BH CV ECHO MEAS - LV MASS(C)S: 91.1 GRAMS
BH CV ECHO MEAS - LV MASS(C)SI: 55 GRAMS/M^2
BH CV ECHO MEAS - LV SYSTOLIC VOL/BSA (12-30): 21.9 ML/M^2
BH CV ECHO MEAS - LVIDD: 4.8 CM
BH CV ECHO MEAS - LVIDS: 2.9 CM
BH CV ECHO MEAS - LVLD AP4: 6.3 CM
BH CV ECHO MEAS - LVLS AP4: 5.2 CM
BH CV ECHO MEAS - LVOT AREA (M): 3.1 CM^2
BH CV ECHO MEAS - LVOT AREA: 3.1 CM^2
BH CV ECHO MEAS - LVOT DIAM: 2 CM
BH CV ECHO MEAS - LVPWD: 0.97 CM
BH CV ECHO MEAS - LVPWS: 1.1 CM
BH CV ECHO MEAS - MV A MAX VEL: 93.8 CM/SEC
BH CV ECHO MEAS - MV E MAX VEL: 69.8 CM/SEC
BH CV ECHO MEAS - MV E/A: 0.74
BH CV ECHO MEAS - PA ACC TIME: 0.09 SEC
BH CV ECHO MEAS - PA PR(ACCEL): 37.6 MMHG
BH CV ECHO MEAS - RAP SYSTOLE: 10 MMHG
BH CV ECHO MEAS - RVSP: 28.7 MMHG
BH CV ECHO MEAS - SI(AO): 97.8 ML/M^2
BH CV ECHO MEAS - SI(CUBED): 53.3 ML/M^2
BH CV ECHO MEAS - SI(MOD-SP4): 33.1 ML/M^2
BH CV ECHO MEAS - SI(TEICH): 46.2 ML/M^2
BH CV ECHO MEAS - SV(AO): 162 ML
BH CV ECHO MEAS - SV(CUBED): 88.3 ML
BH CV ECHO MEAS - SV(MOD-SP4): 54.9 ML
BH CV ECHO MEAS - SV(TEICH): 76.6 ML
BH CV ECHO MEAS - TR MAX VEL: 216 CM/SEC
BH CV NUCLEAR PRIOR STUDY: 3
BH CV REST NUCLEAR ISOTOPE DOSE: 10.5 MCI
BH CV STRESS BP STAGE 1: NORMAL
BH CV STRESS BP STAGE 2: NORMAL
BH CV STRESS COMMENTS STAGE 1: NORMAL
BH CV STRESS COMMENTS STAGE 2: NORMAL
BH CV STRESS DOSE REGADENOSON STAGE 1: 0.4
BH CV STRESS DURATION MIN STAGE 1: 0
BH CV STRESS DURATION MIN STAGE 2: 4
BH CV STRESS DURATION SEC STAGE 1: 10
BH CV STRESS DURATION SEC STAGE 2: 0
BH CV STRESS HR STAGE 1: 105
BH CV STRESS HR STAGE 2: 108
BH CV STRESS NUCLEAR ISOTOPE DOSE: 30.5 MCI
BH CV STRESS PROTOCOL 1: NORMAL
BH CV STRESS RECOVERY BP: NORMAL MMHG
BH CV STRESS RECOVERY HR: 82 BPM
BH CV STRESS STAGE 1: 1
BH CV STRESS STAGE 2: 2
MAXIMAL PREDICTED HEART RATE: 145 BPM
MAXIMAL PREDICTED HEART RATE: 145 BPM
PERCENT MAX PREDICTED HR: 74.48 %
STRESS BASELINE BP: NORMAL MMHG
STRESS BASELINE HR: 66 BPM
STRESS PERCENT HR: 88 %
STRESS POST PEAK BP: NORMAL MMHG
STRESS POST PEAK HR: 108 BPM
STRESS TARGET HR: 123 BPM
STRESS TARGET HR: 123 BPM

## 2021-07-15 PROCEDURE — 93017 CV STRESS TEST TRACING ONLY: CPT

## 2021-07-15 PROCEDURE — 0 TECHNETIUM SESTAMIBI: Performed by: SPECIALIST

## 2021-07-15 PROCEDURE — 93306 TTE W/DOPPLER COMPLETE: CPT

## 2021-07-15 PROCEDURE — 80061 LIPID PANEL: CPT | Performed by: SPECIALIST

## 2021-07-15 PROCEDURE — 78452 HT MUSCLE IMAGE SPECT MULT: CPT | Performed by: SPECIALIST

## 2021-07-15 PROCEDURE — 71046 X-RAY EXAM CHEST 2 VIEWS: CPT

## 2021-07-15 PROCEDURE — 78452 HT MUSCLE IMAGE SPECT MULT: CPT

## 2021-07-15 PROCEDURE — 25010000002 REGADENOSON 0.4 MG/5ML SOLUTION: Performed by: SPECIALIST

## 2021-07-15 PROCEDURE — 93306 TTE W/DOPPLER COMPLETE: CPT | Performed by: SPECIALIST

## 2021-07-15 PROCEDURE — A9500 TC99M SESTAMIBI: HCPCS | Performed by: SPECIALIST

## 2021-07-15 PROCEDURE — 71046 X-RAY EXAM CHEST 2 VIEWS: CPT | Performed by: RADIOLOGY

## 2021-07-15 PROCEDURE — 83036 HEMOGLOBIN GLYCOSYLATED A1C: CPT | Performed by: SPECIALIST

## 2021-07-15 PROCEDURE — 93018 CV STRESS TEST I&R ONLY: CPT | Performed by: SPECIALIST

## 2021-07-15 RX ADMIN — REGADENOSON 0.4 MG: 0.08 INJECTION, SOLUTION INTRAVENOUS at 11:21

## 2021-07-15 RX ADMIN — TECHNETIUM TC 99M SESTAMIBI 1 DOSE: 1 INJECTION INTRAVENOUS at 11:22

## 2021-07-15 RX ADMIN — TECHNETIUM TC 99M SESTAMIBI 1 DOSE: 1 INJECTION INTRAVENOUS at 09:30

## 2021-07-15 NOTE — TELEPHONE ENCOUNTER
Called patient to tell her that her sleep study showed sleep apnea and she is being referred to pulmonology to discuss cpap trial per .  They will be calling her for appointment.  Patient was not available. Left message for call back       Spoke with daughter Genesis per HIPPA guidelines.  She verbalized understanding

## 2021-07-16 ENCOUNTER — HOSPITAL ENCOUNTER (OUTPATIENT)
Dept: RESPIRATORY THERAPY | Facility: HOSPITAL | Age: 76
Discharge: HOME | End: 2021-07-16
Admitting: SPECIALIST

## 2021-07-16 DIAGNOSIS — R00.2 PALPITATIONS: ICD-10-CM

## 2021-07-16 PROCEDURE — 93226 XTRNL ECG REC<48 HR SCAN A/R: CPT

## 2021-07-16 PROCEDURE — 93225 XTRNL ECG REC<48 HRS REC: CPT

## 2021-07-19 PROCEDURE — 93227 XTRNL ECG REC<48 HR R&I: CPT | Performed by: SPECIALIST

## 2021-07-20 ENCOUNTER — TRANSCRIBE ORDERS (OUTPATIENT)
Dept: ADMINISTRATIVE | Facility: HOSPITAL | Age: 76
End: 2021-07-20

## 2021-07-20 DIAGNOSIS — R93.89 ABNORMAL FINDINGS ON DIAGNOSTIC IMAGING OF OTHER SPECIFIED BODY STRUCTURES: Primary | ICD-10-CM

## 2021-07-28 ENCOUNTER — HOSPITAL ENCOUNTER (OUTPATIENT)
Dept: CT IMAGING | Facility: HOSPITAL | Age: 76
Discharge: HOME | End: 2021-07-28
Admitting: NURSE PRACTITIONER

## 2021-07-28 DIAGNOSIS — R93.89 ABNORMAL FINDINGS ON DIAGNOSTIC IMAGING OF OTHER SPECIFIED BODY STRUCTURES: ICD-10-CM

## 2021-07-28 PROCEDURE — 71250 CT THORAX DX C-: CPT | Performed by: RADIOLOGY

## 2021-07-28 PROCEDURE — 71250 CT THORAX DX C-: CPT

## 2021-07-29 ENCOUNTER — OFFICE VISIT (OUTPATIENT)
Dept: CARDIOLOGY | Facility: CLINIC | Age: 76
End: 2021-07-29

## 2021-07-29 VITALS
TEMPERATURE: 97.2 F | HEART RATE: 74 BPM | BODY MASS INDEX: 30.12 KG/M2 | WEIGHT: 153.4 LBS | SYSTOLIC BLOOD PRESSURE: 157 MMHG | DIASTOLIC BLOOD PRESSURE: 77 MMHG | HEIGHT: 60 IN

## 2021-07-29 DIAGNOSIS — E78.2 MIXED HYPERLIPIDEMIA: ICD-10-CM

## 2021-07-29 DIAGNOSIS — R07.2 PRECORDIAL CHEST PAIN: ICD-10-CM

## 2021-07-29 DIAGNOSIS — R73.03 PREDIABETES: ICD-10-CM

## 2021-07-29 DIAGNOSIS — R00.2 PALPITATIONS: ICD-10-CM

## 2021-07-29 DIAGNOSIS — I10 ESSENTIAL HYPERTENSION: Primary | ICD-10-CM

## 2021-07-29 PROCEDURE — 99214 OFFICE O/P EST MOD 30 MIN: CPT | Performed by: SPECIALIST

## 2021-07-29 RX ORDER — LOSARTAN POTASSIUM AND HYDROCHLOROTHIAZIDE 25; 100 MG/1; MG/1
1 TABLET ORAL DAILY
Qty: 90 TABLET | Refills: 1 | Status: SHIPPED | OUTPATIENT
Start: 2021-07-29 | End: 2021-12-02 | Stop reason: SDUPTHER

## 2021-07-29 RX ORDER — CHLORAL HYDRATE 500 MG
CAPSULE ORAL
COMMUNITY
End: 2021-12-02 | Stop reason: SDUPTHER

## 2021-07-29 RX ORDER — PHENOL 1.4 %
600 AEROSOL, SPRAY (ML) MUCOUS MEMBRANE DAILY
COMMUNITY

## 2021-07-29 RX ORDER — ROSUVASTATIN CALCIUM 10 MG/1
10 TABLET, COATED ORAL DAILY
Qty: 30 TABLET | Refills: 11 | Status: SHIPPED | OUTPATIENT
Start: 2021-07-29 | End: 2021-12-02 | Stop reason: SDUPTHER

## 2021-07-29 NOTE — PROGRESS NOTES
Subjective   Follow up, test results  Cindy Medeiros is a 76 y.o. female who presents to day for Follow-up (ECHO, STRESS AND SLEEP STUDY RESULTS).    CHIEF COMPLIANT  Chief Complaint   Patient presents with   • Follow-up     ECHO, STRESS AND SLEEP STUDY RESULTS       Active Problems:  Problem List Items Addressed This Visit        Cardiac and Vasculature    Precordial chest pain    Essential hypertension - Primary    Relevant Medications    losartan-hydrochlorothiazide (Hyzaar) 100-25 MG per tablet    Mixed hyperlipidemia    Relevant Medications    rosuvastatin (CRESTOR) 10 MG tablet    Other Relevant Orders    Lipid Panel    Comprehensive Metabolic Panel    Palpitations       Endocrine and Metabolic    Prediabetes          HPI  HPI  That she feels fine she has no chest pain since have seen her last her shortness of breath is stable has no change since last seen he still have lower extremity swelling on and off had no further palpitations  PRIOR MEDS  Current Outpatient Medications on File Prior to Visit   Medication Sig Dispense Refill   • calcium carbonate (OS-WAYNE) 600 MG tablet Take 600 mg by mouth Daily.     • Diclofenac Sodium (VOLTAREN) 1 % gel gel Apply 4 g topically to the appropriate area as directed 4 (Four) Times a Day As Needed.     • levothyroxine (SYNTHROID, LEVOTHROID) 100 MCG tablet Take 88 mcg by mouth Daily.     • Multiple Minerals-Vitamins (CALCIUM CITRATE +) tablet Take  by mouth.     • Omega-3 Fatty Acids (fish oil) 1000 MG capsule capsule Take  by mouth Daily With Breakfast.     • [DISCONTINUED] hydrochlorothiazide (HYDRODIURIL) 12.5 MG tablet Take 12.5 mg by mouth Daily.     • [DISCONTINUED] losartan (COZAAR) 100 MG tablet Take 100 mg by mouth Daily.     • ondansetron ODT (ZOFRAN-ODT) 4 MG disintegrating tablet Take 1 tablet by mouth Every 6 (Six) Hours As Needed for Nausea or Vomiting. 12 tablet 0   • [DISCONTINUED] meloxicam (MOBIC) 7.5 MG tablet Take 7.5 mg by mouth 2 (Two) Times a Day As  "Needed.       No current facility-administered medications on file prior to visit.       ALLERGIES  Patient has no known allergies.    HISTORY  Past Medical History:   Diagnosis Date   • Cancer (CMS/HCC)     thyroid s/p thyroidectomy   • Disease of thyroid gland    • Hypertension        Social History     Socioeconomic History   • Marital status:      Spouse name: Not on file   • Number of children: Not on file   • Years of education: Not on file   • Highest education level: Not on file   Tobacco Use   • Smoking status: Never Smoker   • Smokeless tobacco: Never Used   Substance and Sexual Activity   • Alcohol use: No   • Drug use: No       Family History   Problem Relation Age of Onset   • Breast cancer Neg Hx        Review of Systems   Constitutional: Negative for activity change and appetite change.   Respiratory: Positive for shortness of breath. Negative for apnea, cough, choking, chest tightness, wheezing and stridor.    Cardiovascular: Positive for leg swelling. Negative for chest pain and palpitations.       Objective     VITALS: /77   Pulse 74   Temp 97.2 °F (36.2 °C)   Ht 152.4 cm (60\")   Wt 69.6 kg (153 lb 6.4 oz)   BMI 29.96 kg/m²     LABS:   Lab Results (most recent)     None          IMAGING:   XR Chest 2 View    Result Date: 7/15/2021    Decreased but persistent opacities of the right upper lobe which may reflect postinfectious scarring. Consider CT for further characterization.  This report was finalized on 7/15/2021 10:16 AM by Dr. Kendrick Viera MD.      CT Chest Without Contrast Diagnostic    Result Date: 7/28/2021   1. Soft tissue mass in the right apex. This could represent scarring but I cannot exclude neoplasm. I would consider PET/CT to further evaluate. 2. Small ground-glass nodular densities also present in both lungs as above 3. Arthritic change in the spine.    This report was finalized on 7/28/2021 10:39 AM by Dr. Law Palmer MD.      XR Chest 1 View    Result Date: " 6/15/2021  Right apical airspace disease  This report was finalized on 6/15/2021 11:14 AM by Dr. Law Palmer MD.        EXAM:  Physical Exam  Vitals reviewed.   Constitutional:       Appearance: She is well-developed.   HENT:      Head: Normocephalic and atraumatic.   Eyes:      Pupils: Pupils are equal, round, and reactive to light.   Neck:      Thyroid: No thyromegaly.      Vascular: No JVD.   Cardiovascular:      Rate and Rhythm: Normal rate and regular rhythm.      Heart sounds: Normal heart sounds. No murmur heard.   No friction rub. No gallop.       Comments: Trace LE edema  Pulmonary:      Effort: Pulmonary effort is normal. No respiratory distress.      Breath sounds: Normal breath sounds. No stridor. No wheezing or rales.   Chest:      Chest wall: No tenderness.   Musculoskeletal:         General: No tenderness or deformity.      Cervical back: Neck supple.   Skin:     General: Skin is warm and dry.   Neurological:      Mental Status: She is alert and oriented to person, place, and time.      Cranial Nerves: No cranial nerve deficit.      Coordination: Coordination normal.         Procedure   Procedures       Assessment/Plan     Diagnoses and all orders for this visit:    1. Essential hypertension (Primary)  -     losartan-hydrochlorothiazide (Hyzaar) 100-25 MG per tablet; Take 1 tablet by mouth Daily.  Dispense: 90 tablet; Refill: 1    2. Precordial chest pain    3. Mixed hyperlipidemia  -     rosuvastatin (CRESTOR) 10 MG tablet; Take 1 tablet by mouth Daily.  Dispense: 30 tablet; Refill: 11  -     Lipid Panel; Future  -     Comprehensive Metabolic Panel; Future    4. Palpitations    5. Prediabetes    1.  We discussed the stress test she could not lay under the camera and right now she has no chest pain actually since she was seen last time I had a long discussion about trying for example doing a stress echo but she rather wait at this point especially that she is asymptomatic  2.  We discussed also the  echocardiogram which showed normal systolic function but with diastolic dysfunction we talked about low-salt diet  3.  Her blood pressure is elevated again today however I reviewed the blood pressure measurements at home which seems to be decent I asked her to bring her blood pressure monitor at the next visit so we can compare it with our blood pressure monitor also at the same time she is going to have CPAP trial for his sleep apnea I am hoping this will affect her blood pressure positively  4.  We reviewed her labs with elevated triglycerides LDL and low HDL, her hemoglobin A1c was 5.5 we talked about results, Will start Rosuvastatin 10 mg daily    Return in about 3 months (around 10/29/2021).               MEDS ORDERED DURING VISIT:  New Medications Ordered This Visit   Medications   • losartan-hydrochlorothiazide (Hyzaar) 100-25 MG per tablet     Sig: Take 1 tablet by mouth Daily.     Dispense:  90 tablet     Refill:  1   • rosuvastatin (CRESTOR) 10 MG tablet     Sig: Take 1 tablet by mouth Daily.     Dispense:  30 tablet     Refill:  11       As always, Mely Gandhi APRN  I appreciate very much the opportunity to participate in the cardiovascular care of your patients. Please do not hesitate to call me with any questions with regards to Cindymike Bowlese evaluation and management.         This document has been electronically signed by Andre Melgoza MD  July 29, 2021 09:53 EDT

## 2021-08-02 ENCOUNTER — TRANSCRIBE ORDERS (OUTPATIENT)
Dept: ADMINISTRATIVE | Facility: HOSPITAL | Age: 76
End: 2021-08-02

## 2021-08-02 DIAGNOSIS — R93.89 ABNORMAL FINDINGS ON DIAGNOSTIC IMAGING OF OTHER SPECIFIED BODY STRUCTURES: Primary | ICD-10-CM

## 2021-08-17 ENCOUNTER — HOSPITAL ENCOUNTER (OUTPATIENT)
Dept: CT IMAGING | Facility: HOSPITAL | Age: 76
Discharge: HOME | End: 2021-08-17
Admitting: NURSE PRACTITIONER

## 2021-08-17 DIAGNOSIS — R93.89 ABNORMAL FINDINGS ON DIAGNOSTIC IMAGING OF OTHER SPECIFIED BODY STRUCTURES: ICD-10-CM

## 2021-08-17 LAB — CREAT BLDA-MCNC: 0.8 MG/DL (ref 0.6–1.3)

## 2021-08-17 PROCEDURE — 71270 CT THORAX DX C-/C+: CPT

## 2021-08-17 PROCEDURE — 71270 CT THORAX DX C-/C+: CPT | Performed by: RADIOLOGY

## 2021-08-17 PROCEDURE — 25010000002 IOPAMIDOL 61 % SOLUTION: Performed by: NURSE PRACTITIONER

## 2021-08-17 PROCEDURE — 82565 ASSAY OF CREATININE: CPT

## 2021-08-17 RX ADMIN — IOPAMIDOL 80 ML: 612 INJECTION, SOLUTION INTRAVENOUS at 09:41

## 2021-08-23 ENCOUNTER — OFFICE VISIT (OUTPATIENT)
Dept: PULMONOLOGY | Facility: CLINIC | Age: 76
End: 2021-08-23

## 2021-08-23 VITALS
OXYGEN SATURATION: 95 % | BODY MASS INDEX: 30.63 KG/M2 | WEIGHT: 156 LBS | TEMPERATURE: 97.7 F | HEART RATE: 98 BPM | SYSTOLIC BLOOD PRESSURE: 152 MMHG | HEIGHT: 60 IN | DIASTOLIC BLOOD PRESSURE: 88 MMHG

## 2021-08-23 DIAGNOSIS — E66.9 OBESITY (BMI 30-39.9): ICD-10-CM

## 2021-08-23 DIAGNOSIS — R91.1 PULMONARY NODULE: ICD-10-CM

## 2021-08-23 DIAGNOSIS — G47.33 OSA (OBSTRUCTIVE SLEEP APNEA): Primary | ICD-10-CM

## 2021-08-23 PROBLEM — R06.02 SHORTNESS OF BREATH: Status: RESOLVED | Noted: 2021-06-17 | Resolved: 2021-08-23

## 2021-08-23 PROCEDURE — 99203 OFFICE O/P NEW LOW 30 MIN: CPT | Performed by: NURSE PRACTITIONER

## 2021-08-23 NOTE — PROGRESS NOTES
"Chief Complaint  Sleep Apnea and pulmonary nodule    Subjective          Cindy Medeiros presents to [facility] PULMONARY AND CRITICAL CARE MEDICINE  History of Present Illness     Ms. Culver is a 76 year old female with a medical history significant for hypertension, hyperlipidemia, thyroid cancer, rheumatoid arthritis and sleep apnea.    She presents today for evaluation of sleep apnea and pulmonary nodule.  She states that she underwent home sleep study that had been ordered by her cardiologist.  Sleep study shows mild sleep apnea.  She initially presented to the emergency department in June 2021 and was diagnosed with pneumonia.  Subsequent CT imaging shows a right upper lobe spiculated nodule, measuring 36 mm x 16 mm.  She denies any shortness of breath at today's visit.  She does report a dry cough that started a few weeks ago.  She she is a lifelong non-smoker, but tells me that she has been exposed to secondhand smoke most of her life.    Objective   Vital Signs:   /88   Pulse 98   Temp 97.7 °F (36.5 °C)   Ht 152.4 cm (60\")   Wt 70.8 kg (156 lb)   SpO2 95%   BMI 30.47 kg/m²     Physical Exam     GENERAL APPEARANCE: Well developed, well nourished, alert and cooperative, and appears to be in no acute distress.    HEAD: normocephalic. Atraumatic.    EYES: PERRL, EOMI. Vision is grossly intact.    THROAT: Oral cavity and pharynx normal. No inflammation, swelling, exudate, or lesions.     NECK: Neck supple.  No thyromegaly.    CARDIAC: Normal S1 and S2. No S3, S4 or murmurs. Rhythm is regular.     RESPIRATORY:Bilateral air entry positive. Bilateral diminished breath sounds. No wheezing, crackles or rhonchi noted.    GI: Positive bowel sounds. Soft, nondistended, nontender.     MUSCULOSKELETAL: No significant deformity or joint abnormality. No edema. Peripheral pulses intact. No varicosities.    NEUROLOGICAL: Strength and sensation symmetric and intact throughout.     PSYCHIATRIC: The " mental examination revealed the patient was oriented to person, place, and time.   Result Review :   The following data was reviewed by: ANGY Arredondo on 08/23/2021:  Common labs    Common Labsle 6/15/21 6/15/21 7/15/21 7/15/21 8/17/21    [id number]    Glucose  112 (A)      BUN  21      Creatinine  0.91   0.80   eGFR Non African Am  60 (A)      Sodium  141      Potassium  3.9      Chloride  106      Calcium  9.9      Albumin  4.03      Total Bilirubin  0.6      Alkaline Phosphatase  78      AST (SGOT)  26      ALT (SGPT)  25      WBC 9.36       Hemoglobin 13.2       Hematocrit 38.7       Platelets 305       Total Cholesterol   205 (A)     Triglycerides   247 (A)     HDL Cholesterol   38 (A)     LDL Cholesterol    123 (A)     Hemoglobin A1C    5.50    (A) Abnormal value       Comments are available for some flowsheets but are not being displayed.           Data reviewed: CT imaging and sleep study.          Assessment and Plan    Diagnoses and all orders for this visit:    1. BRUCE (obstructive sleep apnea) (Primary)  -     Detailed AutoPAP Order    2. Obesity (BMI 30-39.9)    3. Pulmonary nodule  -     NM pet skull base to mid thigh; Future        BRUCE:  Sleep study showed an AHI of 6.2, which is mild sleep apnea.  We will start her on a CPAP nightly.  Patient's Body mass index is 30.47 kg/m². indicating that she is obese (BMI >30). Obesity-related health conditions include the following: obstructive sleep apnea, hypertension and dyslipidemias. Obesity is unchanged. BMI is is above average; BMI management plan is completed. We discussed portion control and increasing exercise..   Patient was educated on positive airway pressure treatment.  As per CMS guidelines, more than 4 hours on 70% of observed nights is considered adherence. Patient was strongly encouraged to use CPAP as much as possible during sleep as more CPAP use is equal to more benefit. Use of heated humidification in positive airway  pressure treatment to improve the adherence to the device.  In case of claustrophobia, we will provide the patient cognitive behavioral therapy and desensitization. Oral appliances use will be discussed with the patient in case of mild to moderate sleep apnea or if the patient with severe disease fail positive airway pressure treatment.       The patient was extensively educated on the consequences of untreated obstructive sleep apnea namely cardiovascular/metabolic disorder, neurocognitive deficit, daytime sleepiness, motor vehicle accidents, depression, mood disorders and reduced quality of life.  At the end of conversation, the patient voices understanding of the disease process and treatment modality.  Patient also understands the risk of untreated obstructive sleep apnea and benefit benefits of the treatment.    Counseling time was greater than 10 minutes.      Pulmonary nodule:  CT imaging showed in the right upper lobe spiculated nodule measuring 36 mm x 16 mm.  We will forward CT imaging to Dr. Jimenes for further review.  We will also order a PET/CT scan.      Follow Up   Return in about 3 months (around 11/23/2021).  Patient was given instructions and counseling regarding her condition or for health maintenance advice. Please see specific information pulled into the AVS if appropriate.

## 2021-08-27 ENCOUNTER — HOSPITAL ENCOUNTER (OUTPATIENT)
Dept: PET IMAGING | Facility: HOSPITAL | Age: 76
Discharge: HOME | End: 2021-08-27
Admitting: NURSE PRACTITIONER

## 2021-08-27 DIAGNOSIS — R91.1 PULMONARY NODULE: ICD-10-CM

## 2021-08-27 DIAGNOSIS — R91.8 MASS OF UPPER LOBE OF RIGHT LUNG: Primary | ICD-10-CM

## 2021-08-27 PROCEDURE — 78815 PET IMAGE W/CT SKULL-THIGH: CPT | Performed by: RADIOLOGY

## 2021-08-27 PROCEDURE — 0 FLUDEOXYGLUCOSE F18 SOLUTION: Performed by: NURSE PRACTITIONER

## 2021-08-27 PROCEDURE — 78815 PET IMAGE W/CT SKULL-THIGH: CPT

## 2021-08-27 PROCEDURE — A9552 F18 FDG: HCPCS | Performed by: NURSE PRACTITIONER

## 2021-08-27 RX ADMIN — FLUDEOXYGLUCOSE F18 1 DOSE: 300 INJECTION INTRAVENOUS at 10:59

## 2021-08-27 NOTE — PROGRESS NOTES
PET/CT results were discussed with the patient.  Will refer her to cardiothoracic surgery for further evaluation.

## 2021-10-13 ENCOUNTER — OFFICE VISIT (OUTPATIENT)
Dept: CARDIAC SURGERY | Facility: CLINIC | Age: 76
End: 2021-10-13

## 2021-10-13 VITALS
OXYGEN SATURATION: 99 % | SYSTOLIC BLOOD PRESSURE: 156 MMHG | HEIGHT: 60 IN | BODY MASS INDEX: 28.78 KG/M2 | WEIGHT: 146.6 LBS | HEART RATE: 98 BPM | TEMPERATURE: 98.7 F | DIASTOLIC BLOOD PRESSURE: 84 MMHG

## 2021-10-13 DIAGNOSIS — R91.8 LUNG MASS: Primary | ICD-10-CM

## 2021-10-13 PROCEDURE — 99203 OFFICE O/P NEW LOW 30 MIN: CPT | Performed by: THORACIC SURGERY (CARDIOTHORACIC VASCULAR SURGERY)

## 2021-10-13 RX ORDER — ASCORBIC ACID 125 MG
TABLET,CHEWABLE ORAL
COMMUNITY

## 2021-10-13 NOTE — PROGRESS NOTES
10/13/2021  Patient Information  Cindy Medeiros                                                                                          [address]  [place] [address]   1945  'PCP/Referring Physician'  Mely Gandhi, APRN  [phone]  Ivonne Messina,*  [phone]  Chief Complaint   Patient presents with   • Consult     Np per Ivonne TRAVIS for Right upper lobe lung nodule, Pt states that she has no pain only fatigue.  Pt also states that believes that GOD has       History of Present Illness: 76-year-old  female with history of hypertension, hyperlipidemia, myocardial infarction and thyroid cancer who presents with a right upper lobe lung mass.  The patient originally presented to the emergency department in June with palpitations and was told that she had pneumonia.  Further imaging demonstrated a right upper lobe lung nodule/mass on CT.  Serial imaging has continued to demonstrate this right upper lobe nodule that is mildly hypermetabolic on most recent PET scan.  She occasionally has a dry cough but denies hemoptysis, lymphadenopathy, fevers or chills.  Patient has lost approximately 10 pounds over the past 3 months with dietary changes.  The patient was recently watching the [employer] on television and they declared that someone had a right-sided lung cancer that had been cured.  The patient and her daughter believe that she has been cured from her lung lesion.    Patient Active Problem List   Diagnosis   • Hypertension   • Osteoarthritis of knee   • Osteoarthritis   • Rheumatoid arthritis (HCC)   • Malignant neoplasm of thyroid gland (HCC)   • Precordial chest pain   • Essential hypertension   • Mixed hyperlipidemia   • Palpitations   • BRUCE (obstructive sleep apnea)   • Abnormal electrocardiogram   • Prediabetes     Past Medical History:   Diagnosis Date   • Cancer (HCC)     thyroid s/p thyroidectomy   • Disease of thyroid gland    • Dyslipidemia    • Hypertension    •  MI (myocardial infarction) (HCC)      Past Surgical History:   Procedure Laterality Date   • THYROID SURGERY     • THYROID SURGERY     • TUBAL ABDOMINAL LIGATION         Current Outpatient Medications:   •  calcium carbonate (OS-WAYNE) 600 MG tablet, Take 600 mg by mouth Daily., Disp: , Rfl:   •  Diclofenac Sodium (VOLTAREN) 1 % gel gel, Apply 4 g topically to the appropriate area as directed 4 (Four) Times a Day As Needed., Disp: , Rfl:   •  levothyroxine (SYNTHROID, LEVOTHROID) 100 MCG tablet, Take 88 mcg by mouth Daily., Disp: , Rfl:   •  losartan-hydrochlorothiazide (Hyzaar) 100-25 MG per tablet, Take 1 tablet by mouth Daily., Disp: 90 tablet, Rfl: 1  •  Multiple Minerals-Vitamins (CALCIUM CITRATE +) tablet, Take  by mouth., Disp: , Rfl:   •  Multiple Vitamins-Minerals (Multi Adult Gummies) chewable tablet, Chew., Disp: , Rfl:   •  Omega-3 Fatty Acids (fish oil) 1000 MG capsule capsule, Take  by mouth Daily With Breakfast., Disp: , Rfl:   •  rosuvastatin (CRESTOR) 10 MG tablet, Take 1 tablet by mouth Daily., Disp: 30 tablet, Rfl: 11  No Known Allergies  Social History     Socioeconomic History   • Marital status:    • Number of children: 3   Tobacco Use   • Smoking status: Never Smoker   • Smokeless tobacco: Never Used   Substance and Sexual Activity   • Alcohol use: No   • Drug use: No     Family History   Problem Relation Age of Onset   • Cancer Mother         PANCREATIC CANCER   • Cancer Brother    • Heart failure Father    • Breast cancer Neg Hx      Review of Systems   Constitutional: Negative for chills, fever, malaise/fatigue, night sweats and weight loss.   HENT: Positive for hoarse voice. Negative for congestion, hearing loss, nosebleeds and odynophagia.    Cardiovascular: Positive for leg swelling. Negative for chest pain, claudication, dyspnea on exertion, orthopnea, palpitations and syncope.   Respiratory: Negative for cough, hemoptysis, shortness of breath and wheezing.    Endocrine: Negative  "for cold intolerance, heat intolerance, polydipsia, polyphagia and polyuria.   Hematologic/Lymphatic: Does not bruise/bleed easily.   Skin: Positive for itching. Negative for poor wound healing and rash.   Musculoskeletal: Positive for arthritis and joint pain. Negative for back pain, joint swelling and myalgias.   Gastrointestinal: Negative for abdominal pain, constipation, diarrhea, hematemesis, melena, nausea and vomiting.   Genitourinary: Negative for dysuria, frequency, hematuria, nocturia and urgency.   Neurological: Positive for loss of balance. Negative for dizziness, light-headedness and numbness.   Psychiatric/Behavioral: Negative for depression and suicidal ideas. The patient is not nervous/anxious.    Allergic/Immunologic: Negative for environmental allergies and HIV exposure.     Vitals:    10/13/21 1005   BP: 156/84   Pulse: 98   Temp: 98.7 °F (37.1 °C)   SpO2: 99%   Weight: 66.5 kg (146 lb 9.6 oz)   Height: 152.4 cm (60\")      Physical Exam  Vitals reviewed.   Constitutional:       General: She is not in acute distress.     Appearance: Normal appearance. She is well-developed. She is not diaphoretic.      Comments:  female who appears stated age and is present with her daughter   HENT:      Head: Normocephalic and atraumatic.      Nose: Nose normal.   Eyes:      General: No scleral icterus.     Conjunctiva/sclera: Conjunctivae normal.   Neck:      Vascular: No JVD.      Trachea: No tracheal deviation.   Cardiovascular:      Rate and Rhythm: Normal rate and regular rhythm.      Heart sounds: Normal heart sounds. No murmur heard.  No friction rub. No gallop.    Pulmonary:      Effort: Pulmonary effort is normal. No respiratory distress.      Breath sounds: Normal breath sounds. No wheezing, rhonchi or rales.   Chest:   Breasts:      Right: No axillary adenopathy or supraclavicular adenopathy.      Left: No axillary adenopathy or supraclavicular adenopathy.       Abdominal:      General: There " is no distension.      Palpations: Abdomen is soft. There is no mass.      Tenderness: There is no abdominal tenderness. There is no guarding or rebound.   Musculoskeletal:         General: No deformity. Normal range of motion.      Cervical back: Neck supple.      Right lower leg: No edema.      Left lower leg: No edema.   Lymphadenopathy:      Cervical: No cervical adenopathy.      Upper Body:      Right upper body: No supraclavicular or axillary adenopathy.      Left upper body: No supraclavicular or axillary adenopathy.   Skin:     General: Skin is warm and dry.      Coloration: Skin is not jaundiced.      Findings: No erythema or rash.   Neurological:      Mental Status: She is alert and oriented to person, place, and time.      Gait: Gait normal.   Psychiatric:         Mood and Affect: Mood normal.         Behavior: Behavior normal.         Thought Content: Thought content normal.         Judgment: Judgment normal.         The ROS, past medical history, surgical history, family history, social history and vitals were reviewed by myself and corrected as needed.      Labs/Imaging:  -PET scan performed 8/27/2021, personally reviewed, demonstrates a right upper lobe mass measuring 2.8 x 2.5 cm with an SUV of 3.3.  No hypermetabolic mediastinal or hilar lymphadenopathy.  No hypermetabolic activity within the opacification in the left upper lobe.  -CT of the chest performed 8/17/2021, personally reviewed, demonstrates a stable right upper lobe spiculated opacification near the apex.  A left upper lobe opacification is also present laterally near the fissure.  -CT of the chest performed 7/28/2021, personally reviewed, demonstrates opacification in the right upper lobe and the left upper lobe.  The right apex has a mass present.  A trace pericardial effusion is present.    Assessment/Plan:  76-year-old  female with history of hypertension, hyperlipidemia, myocardial infarction and thyroid cancer who presents  with a right upper lobe lung mass. We discussed a differential including, but not limited to malignancy, infection and scarring. We discussed options including CT guided needle biopsy versus serial imaging versus surgical excisional biopsy.  I preferred to proceed with a CT guided needle biopsy of this lesion.  The patient did not wish to pursue a needle biopsy and wanted to repeat a CT scan to evaluate for interval growth.  We discussed the potential for interval enlargement.  I will arrange for a repeat CT scan of the chest in one month that will be 3 months from her previous imaging.  The patient will follow-up in clinic after her CT scan to discuss these results.        Patient Active Problem List   Diagnosis   • Hypertension   • Osteoarthritis of knee   • Osteoarthritis   • Rheumatoid arthritis (HCC)   • Malignant neoplasm of thyroid gland (HCC)   • Precordial chest pain   • Essential hypertension   • Mixed hyperlipidemia   • Palpitations   • BRUCE (obstructive sleep apnea)   • Abnormal electrocardiogram   • Prediabetes

## 2021-10-21 ENCOUNTER — HOSPITAL ENCOUNTER (OUTPATIENT)
Dept: CT IMAGING | Facility: HOSPITAL | Age: 76
Discharge: HOME | End: 2021-10-21
Admitting: THORACIC SURGERY (CARDIOTHORACIC VASCULAR SURGERY)

## 2021-10-21 DIAGNOSIS — R91.8 LUNG MASS: ICD-10-CM

## 2021-10-21 LAB — CREAT BLDA-MCNC: 0.8 MG/DL (ref 0.6–1.3)

## 2021-10-21 PROCEDURE — 82565 ASSAY OF CREATININE: CPT

## 2021-10-21 PROCEDURE — 25010000002 IOPAMIDOL 61 % SOLUTION: Performed by: THORACIC SURGERY (CARDIOTHORACIC VASCULAR SURGERY)

## 2021-10-21 PROCEDURE — 71270 CT THORAX DX C-/C+: CPT | Performed by: RADIOLOGY

## 2021-10-21 PROCEDURE — 71270 CT THORAX DX C-/C+: CPT

## 2021-10-21 RX ADMIN — IOPAMIDOL 70 ML: 612 INJECTION, SOLUTION INTRAVENOUS at 09:04

## 2021-11-10 ENCOUNTER — OFFICE VISIT (OUTPATIENT)
Dept: CARDIAC SURGERY | Facility: CLINIC | Age: 76
End: 2021-11-10

## 2021-11-10 VITALS
WEIGHT: 147 LBS | SYSTOLIC BLOOD PRESSURE: 177 MMHG | TEMPERATURE: 97.3 F | HEIGHT: 60 IN | OXYGEN SATURATION: 98 % | BODY MASS INDEX: 28.86 KG/M2 | DIASTOLIC BLOOD PRESSURE: 81 MMHG | HEART RATE: 84 BPM

## 2021-11-10 DIAGNOSIS — R91.8 LUNG MASS: Primary | ICD-10-CM

## 2021-11-10 PROCEDURE — 99213 OFFICE O/P EST LOW 20 MIN: CPT | Performed by: NURSE PRACTITIONER

## 2021-12-02 ENCOUNTER — OFFICE VISIT (OUTPATIENT)
Dept: CARDIOLOGY | Facility: CLINIC | Age: 76
End: 2021-12-02

## 2021-12-02 VITALS
TEMPERATURE: 98.4 F | WEIGHT: 142.6 LBS | HEART RATE: 92 BPM | BODY MASS INDEX: 28 KG/M2 | DIASTOLIC BLOOD PRESSURE: 80 MMHG | HEIGHT: 60 IN | SYSTOLIC BLOOD PRESSURE: 139 MMHG

## 2021-12-02 DIAGNOSIS — I10 PRIMARY HYPERTENSION: Primary | ICD-10-CM

## 2021-12-02 DIAGNOSIS — G47.33 OSA (OBSTRUCTIVE SLEEP APNEA): ICD-10-CM

## 2021-12-02 DIAGNOSIS — R73.03 PREDIABETES: ICD-10-CM

## 2021-12-02 DIAGNOSIS — R00.2 PALPITATIONS: ICD-10-CM

## 2021-12-02 DIAGNOSIS — I10 ESSENTIAL HYPERTENSION: ICD-10-CM

## 2021-12-02 DIAGNOSIS — R07.2 PRECORDIAL CHEST PAIN: ICD-10-CM

## 2021-12-02 DIAGNOSIS — E78.2 MIXED HYPERLIPIDEMIA: ICD-10-CM

## 2021-12-02 PROCEDURE — 99214 OFFICE O/P EST MOD 30 MIN: CPT | Performed by: SPECIALIST

## 2021-12-02 PROCEDURE — 93000 ELECTROCARDIOGRAM COMPLETE: CPT | Performed by: SPECIALIST

## 2021-12-02 RX ORDER — ROSUVASTATIN CALCIUM 10 MG/1
10 TABLET, COATED ORAL DAILY
Qty: 30 TABLET | Refills: 11 | Status: SHIPPED | OUTPATIENT
Start: 2021-12-02

## 2021-12-02 RX ORDER — CHLORAL HYDRATE 500 MG
1000 CAPSULE ORAL
Qty: 90 CAPSULE | Refills: 1 | Status: SHIPPED | OUTPATIENT
Start: 2021-12-02

## 2021-12-02 RX ORDER — LOSARTAN POTASSIUM AND HYDROCHLOROTHIAZIDE 25; 100 MG/1; MG/1
1 TABLET ORAL DAILY
Qty: 90 TABLET | Refills: 1 | Status: SHIPPED | OUTPATIENT
Start: 2021-12-02

## 2021-12-02 NOTE — PROGRESS NOTES
Subjective   Follow up, hypertesigio Medeiros is a 76 y.o. female who presents to day for Follow-up (routine ) and Med Management (pt provided list).    CHIEF COMPLIANT  Chief Complaint   Patient presents with   • Follow-up     routine    • Med Management     pt provided list       Active Problems:  Problem List Items Addressed This Visit        Cardiac and Vasculature    Hypertension - Primary    Relevant Medications    losartan-hydrochlorothiazide (Hyzaar) 100-25 MG per tablet    Precordial chest pain    Essential hypertension    Relevant Medications    losartan-hydrochlorothiazide (Hyzaar) 100-25 MG per tablet    Mixed hyperlipidemia    Relevant Medications    rosuvastatin (CRESTOR) 10 MG tablet    Omega-3 Fatty Acids (fish oil) 1000 MG capsule capsule    Other Relevant Orders    Lipid Panel    Comprehensive Metabolic Panel    Palpitations       Endocrine and Metabolic    Prediabetes       Sleep    BRUCE (obstructive sleep apnea)          HPI  HPI  She is doing better no chest pain she denies shortness of breath no recent palpitations she has trace pedal edema otherwise no new symptoms  PRIOR MEDS  Current Outpatient Medications on File Prior to Visit   Medication Sig Dispense Refill   • calcium carbonate (OS-WAYNE) 600 MG tablet Take 600 mg by mouth Daily.     • Diclofenac Sodium (VOLTAREN) 1 % gel gel Apply 4 g topically to the appropriate area as directed 4 (Four) Times a Day As Needed.     • levothyroxine (SYNTHROID, LEVOTHROID) 100 MCG tablet Take 88 mcg by mouth Daily.     • Multiple Minerals-Vitamins (CALCIUM CITRATE +) tablet Take  by mouth.     • Multiple Vitamins-Minerals (Multi Adult Gummies) chewable tablet Chew.     • [DISCONTINUED] losartan-hydrochlorothiazide (Hyzaar) 100-25 MG per tablet Take 1 tablet by mouth Daily. 90 tablet 1   • [DISCONTINUED] Omega-3 Fatty Acids (fish oil) 1000 MG capsule capsule Take  by mouth Daily With Breakfast.     • [DISCONTINUED] rosuvastatin (CRESTOR) 10 MG tablet Take  "1 tablet by mouth Daily. 30 tablet 11     No current facility-administered medications on file prior to visit.       ALLERGIES  Patient has no known allergies.    HISTORY  Past Medical History:   Diagnosis Date   • Cancer (HCC)     thyroid s/p thyroidectomy   • Disease of thyroid gland    • Dyslipidemia    • Hypertension    • MI (myocardial infarction) (HCC)        Social History     Socioeconomic History   • Marital status:    • Number of children: 3   Tobacco Use   • Smoking status: Never Smoker   • Smokeless tobacco: Never Used   Substance and Sexual Activity   • Alcohol use: No   • Drug use: No       Family History   Problem Relation Age of Onset   • Cancer Mother         PANCREATIC CANCER   • Cancer Brother    • Heart failure Father    • Breast cancer Neg Hx        Review of Systems   Respiratory: Negative for apnea, cough, choking, chest tightness, shortness of breath, wheezing and stridor.    Cardiovascular: Positive for leg swelling. Negative for chest pain and palpitations.       Objective     VITALS: /80   Pulse 92   Temp 98.4 °F (36.9 °C)   Ht 152.4 cm (60\")   Wt 64.7 kg (142 lb 9.6 oz)   BMI 27.85 kg/m²     LABS:   Lab Results (most recent)     None          IMAGING:   CT Chest With & Without Contrast Diagnostic    Result Date: 10/21/2021  Continued spiculated density in the apex of the right chest is unchanged radiographically with no evidence of enhancement. There are diffuse fatty changes noted in the liver. There was no evidence of adenopathy in the mediastinum or hilar areas.        351.20 mGy.cm The radiation dose reduction device was utilized for each scan per the ALARA (as low as reasonably achievable) protocol.  This report was finalized on 10/21/2021 9:54 AM by Dr. Alok Chang II, MD.      CT Chest With & Without Contrast Diagnostic    Result Date: 8/17/2021  1.  Right lung spiculated soft tissue density again noted and appears grossly stable. 2.  Grossly stable appearance " of the chest.  This report was finalized on 8/17/2021 9:46 AM by Dr. Ankit Terry MD.      NM PET/CT Skull Base to Mid Thigh    Result Date: 8/27/2021  Spiculated 28 x 25 mm nodule in the right upper lobe. It does show increased glucose uptake with an SUV of 3.3 consistent with primary lung tumor. The PET fusion CT shows nothing to suggest metastatic disease at this time.  This report was finalized on 8/27/2021 3:42 PM by Dr. Alok Chang II, MD.        EXAM:  Physical Exam  Vitals reviewed.   Constitutional:       Appearance: She is well-developed.   HENT:      Head: Normocephalic and atraumatic.   Eyes:      Pupils: Pupils are equal, round, and reactive to light.   Neck:      Thyroid: No thyromegaly.      Vascular: No JVD.   Cardiovascular:      Rate and Rhythm: Normal rate and regular rhythm.      Heart sounds: Normal heart sounds. No murmur heard.  No friction rub. No gallop.       Comments: Trace edema  Pulmonary:      Effort: Pulmonary effort is normal. No respiratory distress.      Breath sounds: Normal breath sounds. No stridor. No wheezing or rales.   Chest:      Chest wall: No tenderness.   Musculoskeletal:         General: No tenderness or deformity.      Cervical back: Neck supple.   Skin:     General: Skin is warm and dry.   Neurological:      Mental Status: She is alert and oriented to person, place, and time.      Cranial Nerves: No cranial nerve deficit.      Coordination: Coordination normal.         Procedure     ECG 12 Lead    Date/Time: 12/2/2021 8:43 AM  Performed by: Andre Melgoza MD  Authorized by: Andre Melgoza MD           EKG: Normal sinus rhythm old inferior infarction pattern otherwise unremarkable EKG compared with EKG on 6/15/2021 no significant change       Assessment/Plan     Diagnoses and all orders for this visit:    1. Primary hypertension (Primary)    2. Mixed hyperlipidemia  -     rosuvastatin (CRESTOR) 10 MG tablet; Take 1 tablet by mouth Daily.  Dispense: 30 tablet;  Refill: 11  -     Omega-3 Fatty Acids (fish oil) 1000 MG capsule capsule; Take 1 capsule by mouth Daily With Breakfast.  Dispense: 90 capsule; Refill: 1  -     Lipid Panel; Future  -     Comprehensive Metabolic Panel; Future    3. Palpitations    4. Precordial chest pain    5. Prediabetes    6. BRUCE (obstructive sleep apnea)    7. Essential hypertension  -     losartan-hydrochlorothiazide (Hyzaar) 100-25 MG per tablet; Take 1 tablet by mouth Daily.  Dispense: 90 tablet; Refill: 1    Other orders  -     ECG 12 Lead    1.  Her blood pressure is much better now we will continue with the current management  2.  Unfortunately no recent blood work we will try to get lipid profile and CMP prior to next visit we will continue with the statin  3.  She has lost weight she is eating more healthy encouraged to continue so  4.  No further chest pain continue current management  5.  No significant palpitations recently continue current management  6.  Regarding her sleep apnea she still have not received the CPAP machine again she is awaiting getting the machine for CPAP trial    Return in about 6 months (around 6/2/2022).           MEDS ORDERED DURING VISIT:  New Medications Ordered This Visit   Medications   • losartan-hydrochlorothiazide (Hyzaar) 100-25 MG per tablet     Sig: Take 1 tablet by mouth Daily.     Dispense:  90 tablet     Refill:  1   • rosuvastatin (CRESTOR) 10 MG tablet     Sig: Take 1 tablet by mouth Daily.     Dispense:  30 tablet     Refill:  11   • Omega-3 Fatty Acids (fish oil) 1000 MG capsule capsule     Sig: Take 1 capsule by mouth Daily With Breakfast.     Dispense:  90 capsule     Refill:  1       As always, Mely Gandhi APRN  I appreciate very much the opportunity to participate in the cardiovascular care of your patients. Please do not hesitate to call me with any questions with regards to Cindy Medeiros evaluation and management.         This document has been electronically signed by Andre  MD Abad  December 2, 2021 09:59 EST

## 2022-04-19 ENCOUNTER — TELEPHONE (OUTPATIENT)
Dept: CARDIAC SURGERY | Facility: CLINIC | Age: 77
End: 2022-04-19

## 2022-04-19 NOTE — TELEPHONE ENCOUNTER
SPOKE W/ PT ABOUT GETTING NEW INSURANCE PER SCHEDULING NOTE IN REFERRAL. PT SAID SHE HAS SWITCHED TO [employer] BUT DID NOT GIVE NEW INS INFO OVER THE PHONE. PT STATED SHE WANTED TO CANCEL APPT 5/4 DUE TO HER HUSBANDS HEALTH DECLINING.

## 2022-06-08 ENCOUNTER — APPOINTMENT (OUTPATIENT)
Dept: CT IMAGING | Facility: HOSPITAL | Age: 77
End: 2022-06-08

## 2023-12-22 ENCOUNTER — HOSPITAL ENCOUNTER (OUTPATIENT)
Dept: GENERAL RADIOLOGY | Facility: HOSPITAL | Age: 78
Discharge: HOME | End: 2023-12-22
Admitting: NURSE PRACTITIONER
Payer: MEDICARE

## 2023-12-22 ENCOUNTER — TRANSCRIBE ORDERS (OUTPATIENT)
Dept: ADMINISTRATIVE | Facility: HOSPITAL | Age: 78
End: 2023-12-22
Payer: MEDICARE

## 2023-12-22 DIAGNOSIS — M25.561 RIGHT KNEE PAIN, UNSPECIFIED CHRONICITY: ICD-10-CM

## 2023-12-22 DIAGNOSIS — M25.561 RIGHT KNEE PAIN, UNSPECIFIED CHRONICITY: Primary | ICD-10-CM

## 2023-12-22 PROCEDURE — 73562 X-RAY EXAM OF KNEE 3: CPT

## 2024-05-08 ENCOUNTER — TRANSCRIBE ORDERS (OUTPATIENT)
Dept: ADMINISTRATIVE | Facility: HOSPITAL | Age: 79
End: 2024-05-08
Payer: MEDICARE

## 2024-05-08 DIAGNOSIS — Z78.0 ASYMPTOMATIC MENOPAUSAL STATE: Primary | ICD-10-CM

## 2024-05-24 ENCOUNTER — HOSPITAL ENCOUNTER (OUTPATIENT)
Facility: HOSPITAL | Age: 79
Discharge: HOME | End: 2024-05-24
Payer: MEDICARE

## 2024-05-24 DIAGNOSIS — Z78.0 ASYMPTOMATIC MENOPAUSAL STATE: ICD-10-CM

## 2024-05-24 PROCEDURE — 77080 DXA BONE DENSITY AXIAL: CPT

## 2024-11-25 NOTE — PROGRESS NOTES
"     [facility] Follow Up Note     Date of Encounter: 11/10/2021     Name: Cindy Medeiros  : 1945     Referred By: No ref. provider found  PCP: Mely Gandhi APRN    Chief Complaint:    Chief Complaint   Patient presents with   • Follow-up     1 month follow up to discuss CT chest results.   • Lung Nodule       Subjective      History of Present Illness:    Cindy Medeiros is a 76 y.o. female non-smoker with history of thyroid cancer, RA, HTN, HLD on statin therapy, MI, and RUL hypermetabolic lung mass with associated 10 pound weight loss x 3 months initially referred to and seen by Dr. Miller on 10/13/2021.  CT-guided needle biopsy was recommended, however patient states she has been watching the 700 club and believed she had been cured from her lung lesion and requested repeat imaging. She present to clinic today with 3 month scan. Pt denies any constitutional symptoms and has no chronic cough of hemoptysis. She does have intermittent hoarse voice. She still believes God has cured her of her sickness because \"Dash\" came on the same day as her first appointment and said someone with lung cancer is cured. Daughter says that this spot has been on her lung since 2015 on a test done in [place]. We do not have any imaging to confirm or compare.     Review of Systems:  Review of Systems   Constitutional: Negative for chills, decreased appetite, diaphoresis, fever, malaise/fatigue, night sweats, weight gain and weight loss.   HENT: Positive for hoarse voice.    Eyes: Negative for blurred vision, double vision and visual disturbance.   Cardiovascular: Positive for leg swelling. Negative for chest pain, claudication, dyspnea on exertion, irregular heartbeat, near-syncope, orthopnea, palpitations, paroxysmal nocturnal dyspnea and syncope.   Respiratory: Negative for cough, hemoptysis, shortness of breath, sputum production and wheezing.    Hematologic/Lymphatic: Negative for " adenopathy and bleeding problem. Does not bruise/bleed easily.   Skin: Negative for color change, nail changes, poor wound healing and rash.   Musculoskeletal: Positive for joint pain. Negative for back pain, falls and muscle cramps.   Gastrointestinal: Negative for abdominal pain, dysphagia and heartburn.   Genitourinary: Negative for flank pain.   Neurological: Negative for brief paralysis, disturbances in coordination, dizziness, focal weakness, headaches, light-headedness, loss of balance, numbness, paresthesias, sensory change, vertigo and weakness.   Psychiatric/Behavioral: Negative for depression and suicidal ideas.   Allergic/Immunologic: Negative for persistent infections.       I have reviewed the following portions of the patient's history: allergies, current medications, past family history, past medical history, past social history, past surgical history and problem list and confirm it's accurate.    Allergies:  No Known Allergies    Medications:      Current Outpatient Medications:   •  calcium carbonate (OS-WAYNE) 600 MG tablet, Take 600 mg by mouth Daily., Disp: , Rfl:   •  Diclofenac Sodium (VOLTAREN) 1 % gel gel, Apply 4 g topically to the appropriate area as directed 4 (Four) Times a Day As Needed., Disp: , Rfl:   •  levothyroxine (SYNTHROID, LEVOTHROID) 100 MCG tablet, Take 88 mcg by mouth Daily., Disp: , Rfl:   •  losartan-hydrochlorothiazide (Hyzaar) 100-25 MG per tablet, Take 1 tablet by mouth Daily., Disp: 90 tablet, Rfl: 1  •  Multiple Vitamins-Minerals (Multi Adult Gummies) chewable tablet, Chew., Disp: , Rfl:   •  Omega-3 Fatty Acids (fish oil) 1000 MG capsule capsule, Take  by mouth Daily With Breakfast., Disp: , Rfl:   •  rosuvastatin (CRESTOR) 10 MG tablet, Take 1 tablet by mouth Daily., Disp: 30 tablet, Rfl: 11  •  Multiple Minerals-Vitamins (CALCIUM CITRATE +) tablet, Take  by mouth. (Patient not taking: Reported on 11/10/2021), Disp: , Rfl:     History:   Past Medical History:  "  Diagnosis Date   • Cancer (HCC)     thyroid s/p thyroidectomy   • Disease of thyroid gland    • Dyslipidemia    • Hypertension    • MI (myocardial infarction) (HCC)        Past Surgical History:   Procedure Laterality Date   • THYROID SURGERY     • THYROID SURGERY     • TUBAL ABDOMINAL LIGATION         Social History     Socioeconomic History   • Marital status:    • Number of children: 3   Tobacco Use   • Smoking status: Never Smoker   • Smokeless tobacco: Never Used   Substance and Sexual Activity   • Alcohol use: No   • Drug use: No        Family History   Problem Relation Age of Onset   • Cancer Mother         PANCREATIC CANCER   • Cancer Brother    • Heart failure Father    • Breast cancer Neg Hx        Objective   Physical Exam:  Vitals:    11/10/21 0929   BP: 177/81   BP Location: Left arm   Patient Position: Sitting   Pulse: 84   Temp: 97.3 °F (36.3 °C)   SpO2: 98%   Weight: 66.7 kg (147 lb)   Height: 152.4 cm (60\")      Body mass index is 28.71 kg/m².    Physical Exam  Vitals and nursing note reviewed.   Constitutional:       General: She is awake.      Appearance: Normal appearance.   HENT:      Head: Normocephalic and atraumatic.   Eyes:      Pupils: Pupils are equal, round, and reactive to light.   Cardiovascular:      Rate and Rhythm: Normal rate and regular rhythm.      Pulses: Normal pulses.      Heart sounds: Normal heart sounds, S1 normal and S2 normal.   Pulmonary:      Effort: Pulmonary effort is normal.      Breath sounds: Normal breath sounds. No decreased air movement.   Chest:   Breasts:      Right: No axillary adenopathy or supraclavicular adenopathy.      Left: No axillary adenopathy or supraclavicular adenopathy.       Abdominal:      Palpations: Abdomen is soft.   Musculoskeletal:         General: Normal range of motion.      Cervical back: Normal range of motion and neck supple.      Right lower leg: No edema.      Left lower leg: No edema.   Lymphadenopathy:      Cervical: No " cervical adenopathy.      Right cervical: No superficial, deep or posterior cervical adenopathy.     Left cervical: No superficial, deep or posterior cervical adenopathy.      Upper Body:      Right upper body: No supraclavicular or axillary adenopathy.      Left upper body: No supraclavicular or axillary adenopathy.   Skin:     General: Skin is warm and dry.      Capillary Refill: Capillary refill takes less than 2 seconds.      Findings: No lesion.      Nails: There is no clubbing.   Neurological:      General: No focal deficit present.      Mental Status: She is alert and oriented to person, place, and time. Mental status is at baseline.      GCS: GCS eye subscore is 4. GCS verbal subscore is 5. GCS motor subscore is 6.      Cranial Nerves: Cranial nerves are intact.      Sensory: Sensation is intact.      Motor: Motor function is intact.      Coordination: Coordination is intact.      Gait: Gait is intact.   Psychiatric:         Mood and Affect: Mood and affect normal. Mood is not depressed.         Speech: Speech normal.         Behavior: Behavior normal. Behavior is cooperative.         Thought Content: Thought content normal.         Judgment: Judgment normal.         Imaging/Labs:  CT Chest With & Without Contrast Diagnostic-Result Date: 10/21/2021  Continued spiculated density in the apex of the right chest is unchanged radiographically with no evidence of enhancement. There are diffuse fatty changes noted in the liver. There was no evidence of adenopathy in the mediastinum or hilar areas.        351.20 mGy.cm The radiation dose reduction device was utilized for each scan per the ALARA (as low as reasonably achievable) protocol.  This report was finalized on 10/21/2021 9:54 AM by Dr. Alok Chang II, MD.      NM PET/CT Skull Base to Mid Thigh-Result Date: 8/27/2021  Spiculated 28 x 25 mm nodule in the right upper lobe. It does show increased glucose uptake with an SUV of 3.3 consistent with primary lung  tumor. The PET fusion CT shows nothing to suggest metastatic disease at this time.    CT Chest With & Without Contrast Diagnostic-Result Date: 8/17/2021  1.  Right lung spiculated soft tissue density again noted and appears grossly stable.  2.  Grossly stable appearance of the chest.    CT Chest With & Without Contrast Diagnostic-Result Date: 7/28/2021  1. Soft tissue mass in the right apex. This could represent scarring but I cannot exclude neoplasm. I would consider PET/CT to further evaluate.  2. Small ground-glass nodular densities also present in both lungs as above  3. Arthritic change in the spine.    Assessment / Plan      Assessment / Plan:  Diagnoses and all orders for this visit:    1. Lung mass (Primary)  -     CT Chest With & Without Contrast Diagnostic; Future       · 3 month serial imaging for >2cm RUL  That was hypermetabolic (SUV 3.3) on PET  · Pt denies any constitutional symptoms or respiratory complaints  · CT imaging personally reviewed which continues to demonstrate a spiculated mass 2.9cm in RUL  · Majority of encounter spent discussing concern for lung cancer. Imaging reviewed with patient in the room for a visualization  · Pt believes she has been cured by God and continues to decline needle biopsy  · We discussed the risk of delayed treatment of cancer and detrimental effects. Pt and daughter still do not wish to pursue any medical treatment  · Daughter is convinced this area has been present since 2015. I have no imaging or data to confirm. She would like to attempt to obtain imaging from [place] for our office to review.   · Pt is agreeable to continued surveillance but does not wish to do a scan until 6 months.   · She has been encouraged to reach out to our office for any symptoms for earlier eval.     Follow Up:   Return in about 6 months (around 5/10/2022) for Imaging next visit.   Or sooner for any further concerns or worsening sign and symptoms. If unable to reach us in the office  please dial 911 or go to the nearest emergency department.      Rylee TRAVIS  [facility]        Alert and oriented to person, place and time

## 2025-06-06 ENCOUNTER — HOSPITAL ENCOUNTER (EMERGENCY)
Facility: HOSPITAL | Age: 80
Discharge: HOME | End: 2025-06-07
Attending: STUDENT IN AN ORGANIZED HEALTH CARE EDUCATION/TRAINING PROGRAM
Payer: MEDICARE

## 2025-06-06 DIAGNOSIS — B97.89 VIRAL RESPIRATORY ILLNESS: Primary | ICD-10-CM

## 2025-06-06 DIAGNOSIS — B34.8 INFECTION DUE TO HUMAN METAPNEUMOVIRUS (HMPV): ICD-10-CM

## 2025-06-06 DIAGNOSIS — J98.8 VIRAL RESPIRATORY ILLNESS: Primary | ICD-10-CM

## 2025-06-06 LAB
BASOPHILS # BLD AUTO: 0.02 10*3/MM3 (ref 0–0.2)
BASOPHILS NFR BLD AUTO: 0.2 % (ref 0–1.5)
DEPRECATED RDW RBC AUTO: 43.7 FL (ref 37–54)
EOSINOPHIL # BLD AUTO: 0.09 10*3/MM3 (ref 0–0.4)
EOSINOPHIL NFR BLD AUTO: 0.8 % (ref 0.3–6.2)
ERYTHROCYTE [DISTWIDTH] IN BLOOD BY AUTOMATED COUNT: 12.7 % (ref 12.3–15.4)
GLUCOSE BLDC GLUCOMTR-MCNC: 205 MG/DL (ref 70–130)
HCT VFR BLD AUTO: 39.7 % (ref 34–46.6)
HGB BLD-MCNC: 13.3 G/DL (ref 12–15.9)
HOLD SPECIMEN: NORMAL
HOLD SPECIMEN: NORMAL
IMM GRANULOCYTES # BLD AUTO: 0.05 10*3/MM3 (ref 0–0.05)
IMM GRANULOCYTES NFR BLD AUTO: 0.4 % (ref 0–0.5)
LYMPHOCYTES # BLD AUTO: 2.15 10*3/MM3 (ref 0.7–3.1)
LYMPHOCYTES NFR BLD AUTO: 19.2 % (ref 19.6–45.3)
MCH RBC QN AUTO: 31.9 PG (ref 26.6–33)
MCHC RBC AUTO-ENTMCNC: 33.5 G/DL (ref 31.5–35.7)
MCV RBC AUTO: 95.2 FL (ref 79–97)
MONOCYTES # BLD AUTO: 0.56 10*3/MM3 (ref 0.1–0.9)
MONOCYTES NFR BLD AUTO: 5 % (ref 5–12)
NEUTROPHILS NFR BLD AUTO: 74.4 % (ref 42.7–76)
NEUTROPHILS NFR BLD AUTO: 8.33 10*3/MM3 (ref 1.7–7)
NRBC BLD AUTO-RTO: 0 /100 WBC (ref 0–0.2)
PLATELET # BLD AUTO: 275 10*3/MM3 (ref 140–450)
PMV BLD AUTO: 9.5 FL (ref 6–12)
RBC # BLD AUTO: 4.17 10*6/MM3 (ref 3.77–5.28)
WBC NRBC COR # BLD AUTO: 11.2 10*3/MM3 (ref 3.4–10.8)
WHOLE BLOOD HOLD COAG: NORMAL
WHOLE BLOOD HOLD SPECIMEN: NORMAL

## 2025-06-06 PROCEDURE — 99284 EMERGENCY DEPT VISIT MOD MDM: CPT

## 2025-06-06 PROCEDURE — 93005 ELECTROCARDIOGRAM TRACING: CPT | Performed by: NURSE PRACTITIONER

## 2025-06-06 PROCEDURE — 0202U NFCT DS 22 TRGT SARS-COV-2: CPT | Performed by: NURSE PRACTITIONER

## 2025-06-06 PROCEDURE — 36415 COLL VENOUS BLD VENIPUNCTURE: CPT

## 2025-06-06 PROCEDURE — 93010 ELECTROCARDIOGRAM REPORT: CPT | Performed by: INTERNAL MEDICINE

## 2025-06-06 PROCEDURE — 85025 COMPLETE CBC W/AUTO DIFF WBC: CPT | Performed by: NURSE PRACTITIONER

## 2025-06-06 PROCEDURE — 82948 REAGENT STRIP/BLOOD GLUCOSE: CPT

## 2025-06-06 RX ORDER — SODIUM CHLORIDE 0.9 % (FLUSH) 0.9 %
10 SYRINGE (ML) INJECTION AS NEEDED
Status: DISCONTINUED | OUTPATIENT
Start: 2025-06-06 | End: 2025-06-07 | Stop reason: HOSPADM

## 2025-06-07 ENCOUNTER — APPOINTMENT (OUTPATIENT)
Dept: GENERAL RADIOLOGY | Facility: HOSPITAL | Age: 80
End: 2025-06-07
Payer: MEDICARE

## 2025-06-07 VITALS
WEIGHT: 185 LBS | RESPIRATION RATE: 18 BRPM | BODY MASS INDEX: 36.32 KG/M2 | TEMPERATURE: 98.4 F | SYSTOLIC BLOOD PRESSURE: 143 MMHG | OXYGEN SATURATION: 96 % | HEART RATE: 87 BPM | HEIGHT: 60 IN | DIASTOLIC BLOOD PRESSURE: 79 MMHG

## 2025-06-07 LAB
ALBUMIN SERPL-MCNC: 3.8 G/DL (ref 3.5–5.2)
ALBUMIN/GLOB SERPL: 1.5 G/DL
ALP SERPL-CCNC: 108 U/L (ref 39–117)
ALT SERPL W P-5'-P-CCNC: 66 U/L (ref 1–33)
AMPHET+METHAMPHET UR QL: NEGATIVE
AMPHETAMINES UR QL: NEGATIVE
ANION GAP SERPL CALCULATED.3IONS-SCNC: 13.7 MMOL/L (ref 5–15)
AST SERPL-CCNC: 35 U/L (ref 1–32)
B PARAPERT DNA SPEC QL NAA+PROBE: NOT DETECTED
B PERT DNA SPEC QL NAA+PROBE: NOT DETECTED
BARBITURATES UR QL SCN: NEGATIVE
BENZODIAZ UR QL SCN: NEGATIVE
BILIRUB SERPL-MCNC: 0.6 MG/DL (ref 0–1.2)
BILIRUB UR QL STRIP: NEGATIVE
BUN SERPL-MCNC: 20.4 MG/DL (ref 8–23)
BUN/CREAT SERPL: 17.9 (ref 7–25)
BUPRENORPHINE SERPL-MCNC: NEGATIVE NG/ML
C PNEUM DNA NPH QL NAA+NON-PROBE: NOT DETECTED
CALCIUM SPEC-SCNC: 9.6 MG/DL (ref 8.6–10.5)
CANNABINOIDS SERPL QL: NEGATIVE
CHLORIDE SERPL-SCNC: 101 MMOL/L (ref 98–107)
CLARITY UR: CLEAR
CO2 SERPL-SCNC: 21.3 MMOL/L (ref 22–29)
COCAINE UR QL: NEGATIVE
COLOR UR: YELLOW
CREAT SERPL-MCNC: 1.14 MG/DL (ref 0.57–1)
EGFRCR SERPLBLD CKD-EPI 2021: 49.4 ML/MIN/1.73
FENTANYL UR-MCNC: NEGATIVE NG/ML
FLUAV SUBTYP SPEC NAA+PROBE: NOT DETECTED
FLUBV RNA ISLT QL NAA+PROBE: NOT DETECTED
GEN 5 1HR TROPONIN T REFLEX: 20 NG/L
GLOBULIN UR ELPH-MCNC: 2.6 GM/DL
GLUCOSE SERPL-MCNC: 162 MG/DL (ref 65–99)
GLUCOSE UR STRIP-MCNC: NEGATIVE MG/DL
HADV DNA SPEC NAA+PROBE: NOT DETECTED
HCOV 229E RNA SPEC QL NAA+PROBE: NOT DETECTED
HCOV HKU1 RNA SPEC QL NAA+PROBE: NOT DETECTED
HCOV NL63 RNA SPEC QL NAA+PROBE: NOT DETECTED
HCOV OC43 RNA SPEC QL NAA+PROBE: NOT DETECTED
HGB UR QL STRIP.AUTO: NEGATIVE
HMPV RNA NPH QL NAA+NON-PROBE: DETECTED
HPIV1 RNA ISLT QL NAA+PROBE: NOT DETECTED
HPIV2 RNA SPEC QL NAA+PROBE: NOT DETECTED
HPIV3 RNA NPH QL NAA+PROBE: NOT DETECTED
HPIV4 P GENE NPH QL NAA+PROBE: NOT DETECTED
KETONES UR QL STRIP: NEGATIVE
LEUKOCYTE ESTERASE UR QL STRIP.AUTO: NEGATIVE
LIPASE SERPL-CCNC: 30 U/L (ref 13–60)
M PNEUMO IGG SER IA-ACNC: NOT DETECTED
MAGNESIUM SERPL-MCNC: 1.7 MG/DL (ref 1.6–2.4)
METHADONE UR QL SCN: NEGATIVE
NITRITE UR QL STRIP: NEGATIVE
OPIATES UR QL: NEGATIVE
OXYCODONE UR QL SCN: NEGATIVE
PCP UR QL SCN: NEGATIVE
PH UR STRIP.AUTO: 7 [PH] (ref 5–8)
POTASSIUM SERPL-SCNC: 3.3 MMOL/L (ref 3.5–5.2)
PROT SERPL-MCNC: 6.4 G/DL (ref 6–8.5)
PROT UR QL STRIP: NEGATIVE
QT INTERVAL: 382 MS
QTC INTERVAL: 487 MS
RHINOVIRUS RNA SPEC NAA+PROBE: NOT DETECTED
RSV RNA NPH QL NAA+NON-PROBE: NOT DETECTED
SARS-COV-2 RNA RESP QL NAA+PROBE: NOT DETECTED
SODIUM SERPL-SCNC: 136 MMOL/L (ref 136–145)
SP GR UR STRIP: 1.01 (ref 1–1.03)
TRICYCLICS UR QL SCN: NEGATIVE
TROPONIN T % DELTA: 11
TROPONIN T NUMERIC DELTA: 2 NG/L
TROPONIN T SERPL HS-MCNC: 18 NG/L
UROBILINOGEN UR QL STRIP: NORMAL

## 2025-06-07 PROCEDURE — 71045 X-RAY EXAM CHEST 1 VIEW: CPT | Performed by: RADIOLOGY

## 2025-06-07 PROCEDURE — 84484 ASSAY OF TROPONIN QUANT: CPT | Performed by: NURSE PRACTITIONER

## 2025-06-07 PROCEDURE — 96365 THER/PROPH/DIAG IV INF INIT: CPT

## 2025-06-07 PROCEDURE — 80053 COMPREHEN METABOLIC PANEL: CPT | Performed by: NURSE PRACTITIONER

## 2025-06-07 PROCEDURE — 80307 DRUG TEST PRSMV CHEM ANLYZR: CPT | Performed by: NURSE PRACTITIONER

## 2025-06-07 PROCEDURE — 87040 BLOOD CULTURE FOR BACTERIA: CPT | Performed by: NURSE PRACTITIONER

## 2025-06-07 PROCEDURE — 25010000002 AZITHROMYCIN PER 500 MG: Performed by: NURSE PRACTITIONER

## 2025-06-07 PROCEDURE — 83735 ASSAY OF MAGNESIUM: CPT | Performed by: NURSE PRACTITIONER

## 2025-06-07 PROCEDURE — 83690 ASSAY OF LIPASE: CPT | Performed by: NURSE PRACTITIONER

## 2025-06-07 PROCEDURE — 71045 X-RAY EXAM CHEST 1 VIEW: CPT

## 2025-06-07 PROCEDURE — 25810000003 SODIUM CHLORIDE 0.9 % SOLUTION: Performed by: NURSE PRACTITIONER

## 2025-06-07 PROCEDURE — 81003 URINALYSIS AUTO W/O SCOPE: CPT | Performed by: NURSE PRACTITIONER

## 2025-06-07 RX ORDER — AZITHROMYCIN 250 MG/1
TABLET, FILM COATED ORAL
Qty: 6 TABLET | Refills: 0 | Status: SHIPPED | OUTPATIENT
Start: 2025-06-07

## 2025-06-07 RX ADMIN — SODIUM CHLORIDE 1000 ML: 9 INJECTION, SOLUTION INTRAVENOUS at 00:49

## 2025-06-07 RX ADMIN — AZITHROMYCIN MONOHYDRATE 500 MG: 500 INJECTION, POWDER, LYOPHILIZED, FOR SOLUTION INTRAVENOUS at 03:57

## 2025-06-07 NOTE — ED NOTES
Due to pt being stuck multiple times by er techs and labs keep hemolyze, called lab to have a phleb to come stick pt.

## 2025-06-07 NOTE — ED PROVIDER NOTES
Subjective   History of Present Illness  Patient is a 79-year-old female with significant past medical history positive for hypertension, hyperlipidemia, presenting to the ER complaints of nausea and vomiting. Pt C/O Nausea, and vomiting that started a couple of hours ago. Pt has also been very cold and clammy.  Denies chest pain, cough, fever, any additional symptoms at this time.  Patient denies any alleviating or worsening factors.    History provided by:  Patient   used: No        Review of Systems   Constitutional: Negative.  Negative for fever.   HENT: Negative.     Respiratory: Negative.     Cardiovascular: Negative.  Negative for chest pain.   Gastrointestinal:  Positive for nausea and vomiting. Negative for abdominal pain.   Endocrine: Negative.    Genitourinary: Negative.  Negative for dysuria.   Skin: Negative.    Neurological: Negative.    Psychiatric/Behavioral: Negative.     All other systems reviewed and are negative.      Past Medical History:   Diagnosis Date    Cancer     thyroid s/p thyroidectomy    Disease of thyroid gland     Dyslipidemia     Hypertension     MI (myocardial infarction)        No Known Allergies    Past Surgical History:   Procedure Laterality Date    THYROID SURGERY      THYROID SURGERY      TUBAL ABDOMINAL LIGATION         Family History   Problem Relation Age of Onset    Cancer Mother         PANCREATIC CANCER    Cancer Brother     Heart failure Father     Breast cancer Neg Hx        Social History     Socioeconomic History    Marital status:     Number of children: 3   Tobacco Use    Smoking status: Never    Smokeless tobacco: Never   Substance and Sexual Activity    Alcohol use: No    Drug use: No           Objective   Physical Exam  Vitals and nursing note reviewed.   Constitutional:       General: She is not in acute distress.     Appearance: She is well-developed. She is ill-appearing. She is not diaphoretic.      Comments: Chronic ill appearing    HENT:      Head: Normocephalic and atraumatic.      Right Ear: External ear normal.      Left Ear: External ear normal.      Nose: Nose normal.      Mouth/Throat:      Mouth: Mucous membranes are dry.   Eyes:      Conjunctiva/sclera: Conjunctivae normal.      Pupils: Pupils are equal, round, and reactive to light.   Neck:      Vascular: No JVD.      Trachea: No tracheal deviation.   Cardiovascular:      Rate and Rhythm: Normal rate and regular rhythm.      Heart sounds: Normal heart sounds. No murmur heard.  Pulmonary:      Effort: Pulmonary effort is normal. No respiratory distress.      Breath sounds: Normal breath sounds. No wheezing.   Abdominal:      General: Bowel sounds are normal.      Palpations: Abdomen is soft.      Tenderness: There is no abdominal tenderness.   Musculoskeletal:         General: No deformity. Normal range of motion.      Cervical back: Normal range of motion and neck supple.   Skin:     General: Skin is warm and dry.      Capillary Refill: Capillary refill takes 2 to 3 seconds.      Coloration: Skin is pale.      Findings: No erythema or rash.   Neurological:      Mental Status: She is alert and oriented to person, place, and time.      Cranial Nerves: No cranial nerve deficit.   Psychiatric:         Behavior: Behavior normal.         Thought Content: Thought content normal.         Procedures           ED Course  ED Course as of 06/07/25 0509   Fri Jun 06, 2025   2352 WBC(!): 11.20 [SS]   Sat Jun 07, 2025   0054 Human Metapneumovirus(!): Detected [SS]   0054 WBC(!): 11.20 [SS]      ED Course User Index  [SS] Shonda Mruphy APRN                                                       Medical Decision Making  Patient is a 79-year-old female with significant past medical history positive for hypertension, hyperlipidemia, presenting to the ER complaints of nausea and vomiting. Pt C/O Nausea, and vomiting that started a couple of hours ago. Pt has also been very cold and clammy.  Denies  chest pain, cough, fever, any additional symptoms at this time.  Patient denies any alleviating or worsening factors.    MDM:    Escalation of care including admission/observation considered    - Discussions of management with other providers:  None    - Discussed/reviewed with Radiology regarding test interpretation    - Independent interpretation: Labs    - Additional patient history obtained from: None    - Review of external non-ED record (if available):  Prior Inpt record, Office record, Outpt record, Prior Outpt labs, PCP record, Outside ED record, Other    - Chronic conditions affecting care: See HPI and medical Hx.    - Social Determinants of health significantly affecting care:  None        Medical Decision Making Discussion:    Nausea and Vomiting     The patient has been given very strict return precautions to return to the emergency department should there be any acute change or worsening of their condition.  I have explained my findings and the patient has expressed understanding to me.  I explained that the work-up performed in the ED has been based on the specific complaint and concern, as the nature of emergency medicine is complaint driven and they understand that new symptoms may arise.  I have told them that, should there be any new symptoms, worsening or changing symptoms, a new work-up may be indicated that they are encouraged to return to the emergency department or promptly contact their primary care physician. We have employed a shared decision-making process as the discussion of their disposition.  The patient has been educated as to the nature of the visit, the tests and work-up performed and the findings from today's visit. At this time, there does not appear to be any acute emergent process that necessitates admission to the hospital, however, the patient understands that this can change unexpectedly. At this time, the patient is stable for discharge home and agrees to follow-up with her  primary care physician in the next 24 to 48 hours or earlier should they be able to obtain an appointment.    The patient was counseled regarding diagnostic results and treatment plan and patient has indicated understanding of these instructions.    Problems Addressed:  Infection due to human metapneumovirus (hMPV): complicated acute illness or injury  Viral respiratory illness: complicated acute illness or injury    Amount and/or Complexity of Data Reviewed  Labs: ordered. Decision-making details documented in ED Course.  Radiology: ordered.  ECG/medicine tests: ordered.    Risk  Prescription drug management.        Final diagnoses:   Viral respiratory illness   Infection due to human metapneumovirus (hMPV)       ED Disposition  ED Disposition       ED Disposition   Discharge    Condition   Stable    Comment   --               Mely Gandhi, APRN  [address]  [address] [place] [id number] [phone]    Schedule an appointment as soon as possible for a visit            Medication List      No changes were made to your prescriptions during this visit.            Shonda Murphy, APRN  06/07/25 050

## 2025-06-12 LAB
BACTERIA SPEC AEROBE CULT: NORMAL
BACTERIA SPEC AEROBE CULT: NORMAL